# Patient Record
Sex: FEMALE | Race: WHITE | NOT HISPANIC OR LATINO | Employment: OTHER | URBAN - METROPOLITAN AREA
[De-identification: names, ages, dates, MRNs, and addresses within clinical notes are randomized per-mention and may not be internally consistent; named-entity substitution may affect disease eponyms.]

---

## 2020-01-09 ENCOUNTER — HOSPITAL ENCOUNTER (OUTPATIENT)
Dept: OTHER | Facility: HOSPITAL | Age: 80
Discharge: HOME | End: 2020-01-09
Attending: PHYSICAL MEDICINE & REHABILITATION

## 2020-01-09 LAB
ALBUMIN SERPL-MCNC: 3.8 G/DL (ref 3.5–5)
ALBUMIN/GLOB SERPL: 1.1 {RATIO} (ref 1.4–2.6)
ALP SERPL-CCNC: 100 U/L (ref 43–160)
ALT SERPL-CCNC: 8 U/L (ref 10–40)
ANION GAP SERPL CALC-SCNC: 15 MMOL/L (ref 8–19)
AST SERPL-CCNC: 15 U/L (ref 15–50)
BASOPHILS # BLD AUTO: 0.02 10*3/UL (ref 0–0.2)
BASOPHILS NFR BLD AUTO: 0.2 % (ref 0–3)
BILIRUB SERPL-MCNC: 0.48 MG/DL (ref 0.2–1.3)
BUN SERPL-MCNC: 8 MG/DL (ref 5–25)
BUN/CREAT SERPL: 8 {RATIO} (ref 6–20)
CALCIUM SERPL-MCNC: 9.4 MG/DL (ref 8.7–10.4)
CHLORIDE SERPL-SCNC: 100 MMOL/L (ref 99–111)
CHOLEST SERPL-MCNC: 209 MG/DL (ref 107–200)
CHOLEST/HDLC SERPL: 2.8 {RATIO} (ref 3–6)
CONV ABS IMM GRAN: 0.02 10*3/UL (ref 0–0.2)
CONV CO2: 30 MMOL/L (ref 22–32)
CONV IMMATURE GRAN: 0.2 % (ref 0–1.8)
CONV TOTAL PROTEIN: 7.2 G/DL (ref 6.3–8.2)
CREAT UR-MCNC: 0.97 MG/DL (ref 0.5–0.9)
DEPRECATED RDW RBC AUTO: 47.4 FL (ref 36.4–46.3)
EOSINOPHIL # BLD AUTO: 0.12 10*3/UL (ref 0–0.7)
EOSINOPHIL # BLD AUTO: 1.4 % (ref 0–7)
ERYTHROCYTE [DISTWIDTH] IN BLOOD BY AUTOMATED COUNT: 13.4 % (ref 11.7–14.4)
GFR SERPLBLD BASED ON 1.73 SQ M-ARVRAT: 56 ML/MIN/{1.73_M2}
GLOBULIN UR ELPH-MCNC: 3.4 G/DL (ref 2–3.5)
GLUCOSE SERPL-MCNC: 102 MG/DL (ref 65–99)
HCT VFR BLD AUTO: 43.6 % (ref 37–47)
HDLC SERPL-MCNC: 76 MG/DL (ref 40–60)
HGB BLD-MCNC: 14 G/DL (ref 12–16)
LDLC SERPL CALC-MCNC: 111 MG/DL (ref 70–100)
LYMPHOCYTES # BLD AUTO: 1.74 10*3/UL (ref 1–5)
LYMPHOCYTES NFR BLD AUTO: 20.1 % (ref 20–45)
MCH RBC QN AUTO: 30.8 PG (ref 27–31)
MCHC RBC AUTO-ENTMCNC: 32.1 G/DL (ref 33–37)
MCV RBC AUTO: 95.8 FL (ref 81–99)
MONOCYTES # BLD AUTO: 0.86 10*3/UL (ref 0.2–1.2)
MONOCYTES NFR BLD AUTO: 9.9 % (ref 3–10)
NEUTROPHILS # BLD AUTO: 5.89 10*3/UL (ref 2–8)
NEUTROPHILS NFR BLD AUTO: 68.2 % (ref 30–85)
NRBC CBCN: 0 % (ref 0–0.7)
OSMOLALITY SERPL CALC.SUM OF ELEC: 289 MOSM/KG (ref 273–304)
PLATELET # BLD AUTO: 283 10*3/UL (ref 130–400)
PMV BLD AUTO: 11 FL (ref 9.4–12.3)
POTASSIUM SERPL-SCNC: 4.6 MMOL/L (ref 3.5–5.3)
RBC # BLD AUTO: 4.55 10*6/UL (ref 4.2–5.4)
SODIUM SERPL-SCNC: 140 MMOL/L (ref 135–147)
T4 FREE SERPL-MCNC: 1.1 NG/DL (ref 0.9–1.8)
TRIGL SERPL-MCNC: 109 MG/DL (ref 40–150)
TSH SERPL-ACNC: 1.25 M[IU]/L (ref 0.27–4.2)
VLDLC SERPL-MCNC: 22 MG/DL (ref 5–37)
WBC # BLD AUTO: 8.65 10*3/UL (ref 4.8–10.8)

## 2020-01-24 ENCOUNTER — HOSPITAL ENCOUNTER (OUTPATIENT)
Dept: OTHER | Facility: HOSPITAL | Age: 80
Discharge: HOME | End: 2020-01-24
Attending: PHYSICAL MEDICINE & REHABILITATION

## 2020-01-24 LAB
APPEARANCE UR: CLEAR
BILIRUB UR QL: NEGATIVE
COLOR UR: ABNORMAL
CONV BACTERIA: NEGATIVE
CONV COLLECTION SOURCE (UA): ABNORMAL
CONV HYALINE CASTS IN URINE MICRO: ABNORMAL /[LPF]
CONV UROBILINOGEN IN URINE BY AUTOMATED TEST STRIP: 1 {EHRLICHU}/DL (ref 0.1–1)
GLUCOSE UR QL: NEGATIVE MG/DL
HGB UR QL STRIP: NEGATIVE
KETONES UR QL STRIP: ABNORMAL MG/DL
LEUKOCYTE ESTERASE UR QL STRIP: ABNORMAL
NITRITE UR QL STRIP: NEGATIVE
PH UR STRIP.AUTO: 6.5 [PH] (ref 5–8)
PROT UR QL: ABNORMAL MG/DL
RBC #/AREA URNS HPF: ABNORMAL /[HPF]
SP GR UR: 1.03 (ref 1–1.03)
WBC #/AREA URNS HPF: ABNORMAL /[HPF]

## 2020-01-26 LAB — BACTERIA UR CULT: NORMAL

## 2021-07-02 ENCOUNTER — APPOINTMENT (OUTPATIENT)
Dept: GENERAL RADIOLOGY | Facility: HOSPITAL | Age: 81
End: 2021-07-02

## 2021-07-02 VITALS
RESPIRATION RATE: 16 BRPM | WEIGHT: 143.08 LBS | BODY MASS INDEX: 22.99 KG/M2 | SYSTOLIC BLOOD PRESSURE: 130 MMHG | OXYGEN SATURATION: 95 % | HEIGHT: 66 IN | TEMPERATURE: 98 F | DIASTOLIC BLOOD PRESSURE: 75 MMHG | HEART RATE: 66 BPM

## 2021-07-02 LAB
ALBUMIN SERPL-MCNC: 4.2 G/DL (ref 3.5–5.2)
ALBUMIN/GLOB SERPL: 1.2 G/DL
ALP SERPL-CCNC: 89 U/L (ref 39–117)
ALT SERPL W P-5'-P-CCNC: 9 U/L (ref 1–33)
ANION GAP SERPL CALCULATED.3IONS-SCNC: 10.6 MMOL/L (ref 5–15)
AST SERPL-CCNC: 15 U/L (ref 1–32)
BASOPHILS # BLD AUTO: 0.04 10*3/MM3 (ref 0–0.2)
BASOPHILS NFR BLD AUTO: 0.4 % (ref 0–1.5)
BILIRUB SERPL-MCNC: 0.5 MG/DL (ref 0–1.2)
BUN SERPL-MCNC: 14 MG/DL (ref 8–23)
BUN/CREAT SERPL: 13.7 (ref 7–25)
CALCIUM SPEC-SCNC: 9.3 MG/DL (ref 8.6–10.5)
CHLORIDE SERPL-SCNC: 101 MMOL/L (ref 98–107)
CO2 SERPL-SCNC: 24.4 MMOL/L (ref 22–29)
CREAT SERPL-MCNC: 1.02 MG/DL (ref 0.57–1)
DEPRECATED RDW RBC AUTO: 48.8 FL (ref 37–54)
EOSINOPHIL # BLD AUTO: 0.14 10*3/MM3 (ref 0–0.4)
EOSINOPHIL NFR BLD AUTO: 1.3 % (ref 0.3–6.2)
ERYTHROCYTE [DISTWIDTH] IN BLOOD BY AUTOMATED COUNT: 14.2 % (ref 12.3–15.4)
GFR SERPL CREATININE-BSD FRML MDRD: 52 ML/MIN/1.73
GLOBULIN UR ELPH-MCNC: 3.4 GM/DL
GLUCOSE SERPL-MCNC: 114 MG/DL (ref 65–99)
HCT VFR BLD AUTO: 45.9 % (ref 34–46.6)
HGB BLD-MCNC: 14.7 G/DL (ref 12–15.9)
IMM GRANULOCYTES # BLD AUTO: 0.04 10*3/MM3 (ref 0–0.05)
IMM GRANULOCYTES NFR BLD AUTO: 0.4 % (ref 0–0.5)
LYMPHOCYTES # BLD AUTO: 2.01 10*3/MM3 (ref 0.7–3.1)
LYMPHOCYTES NFR BLD AUTO: 18.8 % (ref 19.6–45.3)
MAGNESIUM SERPL-MCNC: 2.1 MG/DL (ref 1.6–2.4)
MCH RBC QN AUTO: 30.2 PG (ref 26.6–33)
MCHC RBC AUTO-ENTMCNC: 32 G/DL (ref 31.5–35.7)
MCV RBC AUTO: 94.3 FL (ref 79–97)
MONOCYTES # BLD AUTO: 0.87 10*3/MM3 (ref 0.1–0.9)
MONOCYTES NFR BLD AUTO: 8.2 % (ref 5–12)
NEUTROPHILS NFR BLD AUTO: 7.57 10*3/MM3 (ref 1.7–7)
NEUTROPHILS NFR BLD AUTO: 70.9 % (ref 42.7–76)
NRBC BLD AUTO-RTO: 0 /100 WBC (ref 0–0.2)
PLATELET # BLD AUTO: 249 10*3/MM3 (ref 140–450)
PMV BLD AUTO: 10.5 FL (ref 6–12)
POTASSIUM SERPL-SCNC: 3.8 MMOL/L (ref 3.5–5.2)
PROT SERPL-MCNC: 7.6 G/DL (ref 6–8.5)
RBC # BLD AUTO: 4.87 10*6/MM3 (ref 3.77–5.28)
SODIUM SERPL-SCNC: 136 MMOL/L (ref 136–145)
TROPONIN T SERPL-MCNC: <0.01 NG/ML (ref 0–0.03)
WBC # BLD AUTO: 10.67 10*3/MM3 (ref 3.4–10.8)

## 2021-07-02 PROCEDURE — 93010 ELECTROCARDIOGRAM REPORT: CPT | Performed by: SPECIALIST

## 2021-07-02 PROCEDURE — 85025 COMPLETE CBC W/AUTO DIFF WBC: CPT

## 2021-07-02 PROCEDURE — 83735 ASSAY OF MAGNESIUM: CPT

## 2021-07-02 PROCEDURE — 80053 COMPREHEN METABOLIC PANEL: CPT

## 2021-07-02 PROCEDURE — 99211 OFF/OP EST MAY X REQ PHY/QHP: CPT

## 2021-07-02 PROCEDURE — 93005 ELECTROCARDIOGRAM TRACING: CPT

## 2021-07-02 PROCEDURE — 84484 ASSAY OF TROPONIN QUANT: CPT

## 2021-07-02 RX ORDER — SODIUM CHLORIDE 0.9 % (FLUSH) 0.9 %
10 SYRINGE (ML) INJECTION AS NEEDED
Status: DISCONTINUED | OUTPATIENT
Start: 2021-07-02 | End: 2021-07-03 | Stop reason: HOSPADM

## 2021-07-03 ENCOUNTER — HOSPITAL ENCOUNTER (EMERGENCY)
Facility: HOSPITAL | Age: 81
Discharge: LEFT BEFORE DISCHARGE | End: 2021-07-03

## 2021-07-03 LAB
HOLD SPECIMEN: NORMAL
HOLD SPECIMEN: NORMAL
WHOLE BLOOD HOLD SPECIMEN: NORMAL

## 2021-07-04 LAB — QT INTERVAL: 410 MS

## 2021-07-11 ENCOUNTER — APPOINTMENT (OUTPATIENT)
Dept: GENERAL RADIOLOGY | Facility: HOSPITAL | Age: 81
End: 2021-07-11

## 2021-07-11 ENCOUNTER — HOSPITAL ENCOUNTER (EMERGENCY)
Facility: HOSPITAL | Age: 81
Discharge: HOME | End: 2021-07-11
Attending: EMERGENCY MEDICINE | Admitting: EMERGENCY MEDICINE

## 2021-07-11 VITALS
WEIGHT: 147.49 LBS | RESPIRATION RATE: 20 BRPM | BODY MASS INDEX: 27.85 KG/M2 | HEART RATE: 70 BPM | DIASTOLIC BLOOD PRESSURE: 77 MMHG | OXYGEN SATURATION: 94 % | SYSTOLIC BLOOD PRESSURE: 120 MMHG | HEIGHT: 61 IN | TEMPERATURE: 98.9 F

## 2021-07-11 DIAGNOSIS — S82.892A CLOSED AVULSION FRACTURE OF LEFT ANKLE, INITIAL ENCOUNTER: Primary | ICD-10-CM

## 2021-07-11 PROCEDURE — 73630 X-RAY EXAM OF FOOT: CPT | Performed by: RADIOLOGY

## 2021-07-11 PROCEDURE — 73610 X-RAY EXAM OF ANKLE: CPT

## 2021-07-11 PROCEDURE — 73630 X-RAY EXAM OF FOOT: CPT

## 2021-07-11 PROCEDURE — 99284 EMERGENCY DEPT VISIT MOD MDM: CPT

## 2021-07-11 PROCEDURE — 73610 X-RAY EXAM OF ANKLE: CPT | Performed by: RADIOLOGY

## 2021-07-11 RX ORDER — VILAZODONE HYDROCHLORIDE 20 MG/1
20 TABLET ORAL DAILY
COMMUNITY

## 2021-07-11 RX ORDER — HYDROCODONE BITARTRATE AND ACETAMINOPHEN 5; 325 MG/1; MG/1
1 TABLET ORAL EVERY 6 HOURS PRN
Status: DISCONTINUED | OUTPATIENT
Start: 2021-07-11 | End: 2021-07-11 | Stop reason: HOSPADM

## 2021-07-11 RX ORDER — DONEPEZIL HYDROCHLORIDE 10 MG/1
10 TABLET, FILM COATED ORAL NIGHTLY
COMMUNITY

## 2021-07-11 RX ORDER — QUETIAPINE FUMARATE 50 MG/1
50 TABLET, FILM COATED ORAL NIGHTLY
COMMUNITY

## 2021-07-11 RX ORDER — ACETAMINOPHEN 325 MG/1
650 TABLET ORAL ONCE
Status: COMPLETED | OUTPATIENT
Start: 2021-07-11 | End: 2021-07-11

## 2021-07-11 RX ORDER — HYDROCODONE BITARTRATE AND ACETAMINOPHEN 5; 325 MG/1; MG/1
1 TABLET ORAL EVERY 8 HOURS PRN
Qty: 8 TABLET | Refills: 0 | Status: SHIPPED | OUTPATIENT
Start: 2021-07-11 | End: 2021-07-14

## 2021-07-11 RX ORDER — DOCUSATE SODIUM 100 MG/1
100 CAPSULE, LIQUID FILLED ORAL 2 TIMES DAILY
Qty: 30 CAPSULE | Refills: 0 | Status: SHIPPED | OUTPATIENT
Start: 2021-07-11 | End: 2021-07-26

## 2021-07-11 RX ORDER — MONTELUKAST SODIUM 10 MG/1
10 TABLET ORAL NIGHTLY
COMMUNITY

## 2021-07-11 RX ADMIN — ACETAMINOPHEN 650 MG: 325 TABLET ORAL at 16:35

## 2021-07-11 RX ADMIN — HYDROCODONE BITARTRATE AND ACETAMINOPHEN 1 TABLET: 5; 325 TABLET ORAL at 18:01

## 2021-07-11 NOTE — ED PROVIDER NOTES
Time: 02:39 EDT  Arrived by: Ambulance  Chief Complaint: Left ankle/foot pain  History provided by: Patient  History is limited by: Alzheimer's     History of Present Illness:  Patient is a 81 y.o. year old female that presents to the emergency department after a fall.  She is walking from room to room and twisted her left ankle and complains of left ankle pain.  She denies any other injury or hitting her head.      Fall  Mechanism of injury: fall    Injury location:  Leg  Leg injury location:  L ankle  Incident location:  Home  Time since incident:  45 minutes  Arrived directly from scene: yes    Fall:     Fall occurred:  Tripped    Impact surface:  Hard floor    Point of impact:  Unable to specify    Entrapped after fall: no    Associated symptoms: no abdominal pain, no chest pain, no headaches, no nausea, no seizures and no vomiting            Similar Symptoms Previously: No  Recently seen: Yes, here for other complaints    Patient Care Team  Primary Care Provider: Dr. Cordoba    Past Medical History:     No Known Allergies  Past Medical History:   Diagnosis Date   • Alzheimer disease (CMS/[facility])      History reviewed. No pertinent surgical history.  History reviewed. No pertinent family history.    Home Medications:  Prior to Admission medications    Not on File        Social History:   PT  reports that she has never smoked. She has never used smokeless tobacco. She reports that she does not drink alcohol and does not use drugs.    Record Review:  I have reviewed the patient's records in [place].     Review of Systems  Review of Systems   Constitutional: Negative for chills and fever.   HENT: Negative for congestion, ear pain and sore throat.    Eyes: Negative for pain.   Respiratory: Negative for cough, chest tightness and shortness of breath.    Cardiovascular: Negative for chest pain.   Gastrointestinal: Negative for abdominal pain, diarrhea, nausea and vomiting.   Genitourinary: Negative for flank pain and  "hematuria.   Musculoskeletal: Negative for joint swelling.        Left ankle pain   Skin: Negative for pallor and wound.   Neurological: Negative for seizures and headaches.   All other systems reviewed and are negative.       Physical Exam  /77 (BP Location: Right arm, Patient Position: Lying)   Pulse 70   Temp 98.9 °F (37.2 °C) (Oral)   Resp 20   Ht 154.9 cm (61\")   Wt 66.9 kg (147 lb 7.8 oz)   SpO2 94%   Breastfeeding No   BMI 27.87 kg/m²     Physical Exam  Vitals and nursing note reviewed.   Constitutional:       General: She is not in acute distress.     Appearance: Normal appearance. She is not toxic-appearing.   HENT:      Head: Normocephalic and atraumatic.      Mouth/Throat:      Mouth: Mucous membranes are moist.   Eyes:      Extraocular Movements: Extraocular movements intact.      Pupils: Pupils are equal, round, and reactive to light.   Cardiovascular:      Rate and Rhythm: Normal rate and regular rhythm.      Pulses: Normal pulses.      Heart sounds: Normal heart sounds.   Pulmonary:      Effort: Pulmonary effort is normal. No respiratory distress.      Breath sounds: Normal breath sounds.   Abdominal:      General: Abdomen is flat.      Palpations: Abdomen is soft.      Tenderness: There is no abdominal tenderness.   Musculoskeletal:         General: Normal range of motion.      Cervical back: Normal range of motion and neck supple.      Right ankle: Normal.      Left ankle: Swelling and ecchymosis present. Tenderness present over the lateral malleolus. Normal pulse.      Left foot: Swelling and tenderness present.        Legs:    Skin:     General: Skin is warm and dry.   Neurological:      Mental Status: She is alert and oriented to person, place, and time. Mental status is at baseline.                  ED Course  /77 (BP Location: Right arm, Patient Position: Lying)   Pulse 70   Temp 98.9 °F (37.2 °C) (Oral)   Resp 20   Ht 154.9 cm (61\")   Wt 66.9 kg (147 lb 7.8 oz)   SpO2 " 94%   Breastfeeding No   BMI 27.87 kg/m²     Medications   acetaminophen (TYLENOL) tablet 650 mg (650 mg Oral Given 7/11/21 1635)     XR Ankle 3+ View Left    Result Date: 7/11/2021  Narrative: PROCEDURE: XR FOOT 3+ VW LEFT, 7/11/2021, 16:44 XR ANKLE 3+ VW LEFT, 7/11/2021, 16:44  COMPARISON: None  INDICATIONS: LEFT LATERAL/DORSAL FOOT PAIN POST FALL  FINDINGS:  Bony structures have an osteopenic appearance.  5 mm bony density adjacent to the anterior and cranial aspect of the talus may represent degenerative spur or possibly avulsion type injury.  Mild degenerative change consistent with osteoarthritis is seen in the tibiotalar joint.  Mild degenerative changes seen in the tarsal region.  Moderate degenerative changes seen in the 1st MTP joint, with mild hallux valgus and bunion deformity.  CONCLUSION:  Left ankle and left foot series demonstrating 5 mm bony density adjacent to the anterior and cranial aspect of the talus which may represent degenerative spur or avulsion type injury.  Degenerative change consistent with osteoarthritis, as above.      BRANDAN MATA MD       Electronically Signed and Approved By: BRANDAN MATA MD on 7/11/2021 at 17:15             XR Foot 3+ View Left    Result Date: 7/11/2021  Narrative: PROCEDURE: XR FOOT 3+ VW LEFT, 7/11/2021, 16:44 XR ANKLE 3+ VW LEFT, 7/11/2021, 16:44  COMPARISON: None  INDICATIONS: LEFT LATERAL/DORSAL FOOT PAIN POST FALL  FINDINGS:  Bony structures have an osteopenic appearance.  5 mm bony density adjacent to the anterior and cranial aspect of the talus may represent degenerative spur or possibly avulsion type injury.  Mild degenerative change consistent with osteoarthritis is seen in the tibiotalar joint.  Mild degenerative changes seen in the tarsal region.  Moderate degenerative changes seen in the 1st MTP joint, with mild hallux valgus and bunion deformity.  CONCLUSION:  Left ankle and left foot series demonstrating 5 mm bony density adjacent to the  anterior and cranial aspect of the talus which may represent degenerative spur or avulsion type injury.  Degenerative change consistent with osteoarthritis, as above.      BRANDAN MATA MD       Electronically Signed and Approved By: BRANDAN MATA MD on 7/11/2021 at 17:15               Procedures/EKGs:  Procedures        Medical Decision Making:                     MDM  Number of Diagnoses or Management Options  Closed avulsion fracture of left ankle, initial encounter: new and requires workup  Diagnosis management comments: The patient was placed in a splint.  Her pain was improved after medication.  She is instructed to follow-up with Dr. Romo, call his office tomorrow.       Amount and/or Complexity of Data Reviewed  Tests in the radiology section of CPT®: ordered and reviewed         Final diagnoses:   Closed avulsion fracture of left ankle, initial encounter        Disposition:  ED Disposition     ED Disposition Condition Comment    Discharge Stable            Radha De Los Santos, APRN  07/12/21 0239

## 2021-07-11 NOTE — DISCHARGE INSTRUCTIONS
Please call Dr. Bowden's office in the morning to schedule appointment for follow-up.  Wear your walking boot.  Elevate your leg and apply ice several times a day to relieve swelling.      Your pain medication may make you constipated, take the prescribed stool softener while taking the pain medication.

## 2021-07-14 ENCOUNTER — OFFICE VISIT (OUTPATIENT)
Dept: PODIATRY | Facility: CLINIC | Age: 81
End: 2021-07-14

## 2021-07-14 VITALS
DIASTOLIC BLOOD PRESSURE: 78 MMHG | WEIGHT: 147 LBS | HEIGHT: 61 IN | TEMPERATURE: 97.1 F | SYSTOLIC BLOOD PRESSURE: 145 MMHG | OXYGEN SATURATION: 98 % | HEART RATE: 84 BPM | BODY MASS INDEX: 27.75 KG/M2

## 2021-07-14 DIAGNOSIS — L60.0 ONYCHOCRYPTOSIS: ICD-10-CM

## 2021-07-14 DIAGNOSIS — G30.9 ALZHEIMER'S DEMENTIA WITHOUT BEHAVIORAL DISTURBANCE, UNSPECIFIED TIMING OF DEMENTIA ONSET: ICD-10-CM

## 2021-07-14 DIAGNOSIS — M79.671 FOOT PAIN, BILATERAL: Primary | ICD-10-CM

## 2021-07-14 DIAGNOSIS — M79.672 FOOT PAIN, BILATERAL: Primary | ICD-10-CM

## 2021-07-14 DIAGNOSIS — F02.80 ALZHEIMER'S DEMENTIA WITHOUT BEHAVIORAL DISTURBANCE, UNSPECIFIED TIMING OF DEMENTIA ONSET: ICD-10-CM

## 2021-07-14 DIAGNOSIS — S82.892A CLOSED FRACTURE OF LEFT ANKLE, INITIAL ENCOUNTER: ICD-10-CM

## 2021-07-14 DIAGNOSIS — S93.492A SPRAIN OF ANTERIOR TALOFIBULAR LIGAMENT OF LEFT ANKLE, INITIAL ENCOUNTER: ICD-10-CM

## 2021-07-14 DIAGNOSIS — B35.1 ONYCHOMYCOSIS: ICD-10-CM

## 2021-07-14 PROCEDURE — 11721 DEBRIDE NAIL 6 OR MORE: CPT | Performed by: PODIATRIST

## 2021-07-14 PROCEDURE — 99203 OFFICE O/P NEW LOW 30 MIN: CPT | Performed by: PODIATRIST

## 2021-07-14 NOTE — PATIENT INSTRUCTIONS
Patient is transition to regular shoe gear as tolerated.    Patient and her son are to monitor for recurrence and any new symptoms and to contact Dr. Bowden's office for a follow-up appointment.      The patient and her son state understanding and agreement with this plan.

## 2021-07-14 NOTE — PROGRESS NOTES
[facility] - PODIATRY    Today's Date: 07/14/21    Patient Name: Sonja Gunn  MRN: [id number]  CSN: [id number]  PCP: Mike Cordoba MD  Referring Provider: No ref. provider found    SUBJECTIVE     Chief Complaint   Patient presents with   • Left Foot - Fracture     HPI: Sonja Gunn, a 81 y.o.female, comes to clinic.    New problem: Injured left ankle  11 July 2021  Acute at her apartment at [facility]  Sore at first, improving, has been walking without the cam walker in her apartment  Minimal pain with shoe gear and ambulation  ER visit, x-rays, Cam walker applied    New, Established, New Problem:  New, second problem  Location:  Toenails  Duration:   Greater than five years  Onset:  Gradual  Nature:  sore with palpation.  Stable, worsening, improving:   Worsening  Aggravating factors:  Pain with shoe gear and ambulation.  Previous Treatment:  debridement at [facility]    No other pedal complaints at this time.    Patient denies any fevers, chills, nausea, vomiting, shortness of breathe, nor any other constitutional signs nor symptoms.       Last PCP Visit:  Mike Cordoba MD, April 2021    She presents with her adult son  Past Medical History:   Diagnosis Date   • Alzheimer disease (CMS/HCC)      History reviewed. No pertinent surgical history.  Family History   Family history unknown: Yes     Social History     Socioeconomic History   • Marital status:      Spouse name: Not on file   • Number of children: Not on file   • Years of education: Not on file   • Highest education level: Not on file   Tobacco Use   • Smoking status: Never Smoker   • Smokeless tobacco: Never Used   Vaping Use   • Vaping Use: Never used   Substance and Sexual Activity   • Alcohol use: Never   • Drug use: Never   • Sexual activity: Defer     No Known Allergies  Current Outpatient Medications   Medication Sig Dispense Refill   • docusate sodium (COLACE) 100 MG capsule Take 1 capsule by  mouth 2 (Two) Times a Day for 15 days. 30 capsule 0   • donepezil (ARICEPT) 10 MG tablet Take 10 mg by mouth Every Night.     • HYDROcodone-acetaminophen (NORCO) 5-325 MG per tablet Take 1 tablet by mouth Every 8 (Eight) Hours As Needed for Moderate Pain  for up to 3 days. 8 tablet 0   • montelukast (SINGULAIR) 10 MG tablet Take 10 mg by mouth Every Night.     • QUEtiapine (SEROquel) 50 MG tablet Take 50 mg by mouth Every Night.     • vilazodone (VIIBRYD) 20 MG tablet tablet Take 20 mg by mouth Daily.       No current facility-administered medications for this visit.     Review of Systems   Constitutional: Negative.    Musculoskeletal:        Sore anterior lateral left ankle   Skin:        Painful toenails bilaterally   All other systems reviewed and are negative.      OBJECTIVE     Vitals:    21 1250   BP: 145/78   Pulse: 84   Temp: 97.1 °F (36.2 °C)   SpO2: 98%       Patient seen in no apparent distress.      PHYSICAL EXAM:     Foot/Ankle Exam:       General:   Appearance: elderly    Appearance comment:  Alzheimer's dementia  Orientation: disoriented    Gait: antalgic    Shoes: MRI left foot and lower leg, regular shoe on right foot.    VASCULAR      Right Foot Vascularity   Dorsalis pedis:  1+  Posterior tibial:  1+  Skin Temperature: cool    Edema Gradin+  CFT:  3  Varicosities: moderate varicosities       Left Foot Vascularity   Dorsalis pedis:  1+  Posterior tibial:  1+  Skin Temperature: cool    Edema Gradin+  CFT:  3  Varicosities: moderate varicosities        NEUROLOGIC     Right Foot Neurologic   Normal sensation    Light touch sensation:  Normal  Vibratory sensation:  Normal  Hot/Cold sensation: normal       Left Foot Neurologic   Normal sensation    Light touch sensation:  Normal  Vibratory sensation:  Normal  Hot/cold sensation: normal       MUSCULOSKELETAL      Left Foot Musculoskeletal   Ecchymosis:  Ankle joint (Ecchymosis anterior lateral left ankle.)  Tenderness comment:  Slight  tenderness palpation anterior lateral left ankle.  No guarding with range of motion.     MUSCLE STRENGTH     Right Foot Muscle Strength   Foot dorsiflexion:  4-  Foot plantar flexion:  4-  Foot inversion:  4-  Foot eversion:  4-     Left Foot Muscle Strength   Foot dorsiflexion:  4-  Foot plantar flexion:  4-  Foot inversion:  4-  Foot eversion:  4-     DERMATOLOGIC     Right Foot Dermatologic   Skin: skin intact    Nails: onychomycosis, abnormally thick, subungual debris, dystrophic nails and ingrown toenail    Nails comment:  Toenails 1, 2, 3, 4, and 5     Left Foot Dermatologic   Skin: skin intact    Nails: onychomycosis, abnormally thick, subungual debris, dystrophic nails and ingrown toenail    Nails comment:  Toenails 1, 2, 3, 4, and 5    Radiographs from [facility] x-rays show possible avulsion fracture anterior left ankle joint versus possible old avulsion fracture.    ASSESSMENT/PLAN     Diagnoses and all orders for this visit:    1. Foot pain, bilateral (Primary)    2. Onychomycosis    3. Alzheimer's dementia without behavioral disturbance, unspecified timing of dementia onset (CMS/[facility])    4. Onychocryptosis    5. Sprain of anterior talofibular ligament of left ankle, initial encounter    6. Closed fracture of left ankle, initial encounter        Comprehensive lower extremity examination and evaluation was performed.    Discussed findings and treatment plan including risks, benefits, and treatment options with patient in detail. Patient agreed with treatment plan.    Toenails 1 through 5 bilaterally were debrided thickness and length and then smoothed with a Dremel Tool.  Tolerated the procedure well without complications.    An After Visit Summary was printed and given to the patient at discharge, including (if requested) any available informative/educational handouts regarding diagnosis, treatment, or medications. All questions were answered to patient/family satisfaction. Should symptoms fail  to improve or worsen they agree to call or return to clinic or to go to the Emergency Department. Discussed the importance of following up with any needed screening tests/labs/specialist appointments and any requested follow-up recommended by me today. Importance of maintaining follow-up discussed and patient accepts that missed appointments can delay diagnosis and potentially lead to worsening of conditions.    Return Patient son request as needed follow-up, if symptoms worsen or fail to improve., or sooner if acute issues arise.    This document has been electronically signed by Carl Bowden DPM on July 14, 2021 15:39 EDT

## 2022-03-29 ENCOUNTER — HOSPITAL ENCOUNTER (EMERGENCY)
Facility: HOSPITAL | Age: 82
Discharge: HOME | End: 2022-03-29
Attending: EMERGENCY MEDICINE | Admitting: EMERGENCY MEDICINE

## 2022-03-29 VITALS
OXYGEN SATURATION: 96 % | RESPIRATION RATE: 18 BRPM | DIASTOLIC BLOOD PRESSURE: 70 MMHG | TEMPERATURE: 98.4 F | HEART RATE: 74 BPM | HEIGHT: 64 IN | BODY MASS INDEX: 22.92 KG/M2 | SYSTOLIC BLOOD PRESSURE: 104 MMHG | WEIGHT: 134.26 LBS

## 2022-03-29 DIAGNOSIS — K59.00 CONSTIPATION, UNSPECIFIED CONSTIPATION TYPE: Primary | ICD-10-CM

## 2022-03-29 PROCEDURE — 99284 EMERGENCY DEPT VISIT MOD MDM: CPT

## 2022-12-09 ENCOUNTER — LAB REQUISITION (OUTPATIENT)
Dept: LAB | Facility: HOSPITAL | Age: 82
End: 2022-12-09

## 2022-12-09 DIAGNOSIS — Z79.899 OTHER LONG TERM (CURRENT) DRUG THERAPY: ICD-10-CM

## 2022-12-09 LAB
25(OH)D3 SERPL-MCNC: 64 NG/ML (ref 30–100)
ALBUMIN SERPL-MCNC: 3.4 G/DL (ref 3.5–5.2)
ALBUMIN/GLOB SERPL: 1 G/DL
ALP SERPL-CCNC: 93 U/L (ref 39–117)
ALT SERPL W P-5'-P-CCNC: <5 U/L (ref 1–33)
ANION GAP SERPL CALCULATED.3IONS-SCNC: 9.6 MMOL/L (ref 5–15)
AST SERPL-CCNC: 11 U/L (ref 1–32)
BASOPHILS # BLD AUTO: 0.02 10*3/MM3 (ref 0–0.2)
BASOPHILS NFR BLD AUTO: 0.2 % (ref 0–1.5)
BILIRUB SERPL-MCNC: 0.5 MG/DL (ref 0–1.2)
BUN SERPL-MCNC: 8 MG/DL (ref 8–23)
BUN/CREAT SERPL: 11.6 (ref 7–25)
CALCIUM SPEC-SCNC: 9.1 MG/DL (ref 8.6–10.5)
CHLORIDE SERPL-SCNC: 101 MMOL/L (ref 98–107)
CHOLEST SERPL-MCNC: 214 MG/DL (ref 0–200)
CO2 SERPL-SCNC: 28.4 MMOL/L (ref 22–29)
CREAT SERPL-MCNC: 0.69 MG/DL (ref 0.57–1)
DEPRECATED RDW RBC AUTO: 45 FL (ref 37–54)
EGFRCR SERPLBLD CKD-EPI 2021: 87.9 ML/MIN/1.73
EOSINOPHIL # BLD AUTO: 0.22 10*3/MM3 (ref 0–0.4)
EOSINOPHIL NFR BLD AUTO: 2.1 % (ref 0.3–6.2)
ERYTHROCYTE [DISTWIDTH] IN BLOOD BY AUTOMATED COUNT: 13.1 % (ref 12.3–15.4)
FOLATE SERPL-MCNC: 18.3 NG/ML (ref 4.78–24.2)
GLOBULIN UR ELPH-MCNC: 3.5 GM/DL
GLUCOSE SERPL-MCNC: 92 MG/DL (ref 65–99)
HCT VFR BLD AUTO: 39.8 % (ref 34–46.6)
HDLC SERPL-MCNC: 50 MG/DL (ref 40–60)
HGB BLD-MCNC: 13.2 G/DL (ref 12–15.9)
IMM GRANULOCYTES # BLD AUTO: 0.05 10*3/MM3 (ref 0–0.05)
IMM GRANULOCYTES NFR BLD AUTO: 0.5 % (ref 0–0.5)
LDLC SERPL CALC-MCNC: 146 MG/DL (ref 0–100)
LDLC/HDLC SERPL: 2.89 {RATIO}
LYMPHOCYTES # BLD AUTO: 2.19 10*3/MM3 (ref 0.7–3.1)
LYMPHOCYTES NFR BLD AUTO: 21.3 % (ref 19.6–45.3)
MCH RBC QN AUTO: 31.1 PG (ref 26.6–33)
MCHC RBC AUTO-ENTMCNC: 33.2 G/DL (ref 31.5–35.7)
MCV RBC AUTO: 93.6 FL (ref 79–97)
MONOCYTES # BLD AUTO: 1.24 10*3/MM3 (ref 0.1–0.9)
MONOCYTES NFR BLD AUTO: 12.1 % (ref 5–12)
NEUTROPHILS NFR BLD AUTO: 6.55 10*3/MM3 (ref 1.7–7)
NEUTROPHILS NFR BLD AUTO: 63.8 % (ref 42.7–76)
NRBC BLD AUTO-RTO: 0 /100 WBC (ref 0–0.2)
PLATELET # BLD AUTO: 338 10*3/MM3 (ref 140–450)
PMV BLD AUTO: 10.8 FL (ref 6–12)
POTASSIUM SERPL-SCNC: 3.3 MMOL/L (ref 3.5–5.2)
PROT SERPL-MCNC: 6.9 G/DL (ref 6–8.5)
RBC # BLD AUTO: 4.25 10*6/MM3 (ref 3.77–5.28)
SODIUM SERPL-SCNC: 139 MMOL/L (ref 136–145)
TRIGL SERPL-MCNC: 98 MG/DL (ref 0–150)
VIT B12 BLD-MCNC: 193 PG/ML (ref 211–946)
VLDLC SERPL-MCNC: 18 MG/DL (ref 5–40)
WBC NRBC COR # BLD: 10.27 10*3/MM3 (ref 3.4–10.8)

## 2022-12-09 PROCEDURE — 82306 VITAMIN D 25 HYDROXY: CPT

## 2022-12-09 PROCEDURE — 85025 COMPLETE CBC W/AUTO DIFF WBC: CPT

## 2022-12-09 PROCEDURE — 82746 ASSAY OF FOLIC ACID SERUM: CPT

## 2022-12-09 PROCEDURE — 80061 LIPID PANEL: CPT

## 2022-12-09 PROCEDURE — 82607 VITAMIN B-12: CPT

## 2022-12-09 PROCEDURE — 80053 COMPREHEN METABOLIC PANEL: CPT

## 2023-01-24 ENCOUNTER — LAB REQUISITION (OUTPATIENT)
Dept: LAB | Facility: HOSPITAL | Age: 83
End: 2023-01-24
Payer: MEDICARE

## 2023-01-24 DIAGNOSIS — R30.0 DYSURIA: ICD-10-CM

## 2023-01-24 LAB
BILIRUB UR QL STRIP: NEGATIVE
CLARITY UR: CLEAR
COLOR UR: ABNORMAL
GLUCOSE UR STRIP-MCNC: NEGATIVE MG/DL
HGB UR QL STRIP.AUTO: NEGATIVE
KETONES UR QL STRIP: ABNORMAL
LEUKOCYTE ESTERASE UR QL STRIP.AUTO: NEGATIVE
NITRITE UR QL STRIP: NEGATIVE
PH UR STRIP.AUTO: <=5 [PH] (ref 5–8)
PROT UR QL STRIP: ABNORMAL
SP GR UR STRIP: >=1.03 (ref 1–1.03)
UROBILINOGEN UR QL STRIP: ABNORMAL

## 2023-01-24 PROCEDURE — 81003 URINALYSIS AUTO W/O SCOPE: CPT

## 2023-02-27 ENCOUNTER — HOSPITAL ENCOUNTER (EMERGENCY)
Facility: HOSPITAL | Age: 83
Discharge: HOME | End: 2023-02-27
Attending: EMERGENCY MEDICINE | Admitting: EMERGENCY MEDICINE
Payer: MEDICARE

## 2023-02-27 VITALS
WEIGHT: 143.96 LBS | OXYGEN SATURATION: 98 % | HEIGHT: 64 IN | TEMPERATURE: 98.2 F | HEART RATE: 74 BPM | BODY MASS INDEX: 24.58 KG/M2 | RESPIRATION RATE: 20 BRPM | SYSTOLIC BLOOD PRESSURE: 106 MMHG | DIASTOLIC BLOOD PRESSURE: 70 MMHG

## 2023-02-27 DIAGNOSIS — R13.10 DYSPHAGIA, UNSPECIFIED TYPE: Primary | ICD-10-CM

## 2023-02-27 PROCEDURE — 99283 EMERGENCY DEPT VISIT LOW MDM: CPT

## 2024-01-11 ENCOUNTER — APPOINTMENT (OUTPATIENT)
Dept: CT IMAGING | Facility: HOSPITAL | Age: 84
End: 2024-01-11
Payer: MEDICARE

## 2024-01-11 ENCOUNTER — HOSPITAL ENCOUNTER (EMERGENCY)
Facility: HOSPITAL | Age: 84
Discharge: HOME | End: 2024-01-11
Attending: EMERGENCY MEDICINE
Payer: MEDICARE

## 2024-01-11 ENCOUNTER — APPOINTMENT (OUTPATIENT)
Dept: GENERAL RADIOLOGY | Facility: HOSPITAL | Age: 84
End: 2024-01-11
Payer: MEDICARE

## 2024-01-11 VITALS
DIASTOLIC BLOOD PRESSURE: 70 MMHG | WEIGHT: 143.96 LBS | SYSTOLIC BLOOD PRESSURE: 115 MMHG | HEIGHT: 64 IN | BODY MASS INDEX: 24.58 KG/M2 | RESPIRATION RATE: 15 BRPM | HEART RATE: 69 BPM | OXYGEN SATURATION: 95 % | TEMPERATURE: 98 F

## 2024-01-11 DIAGNOSIS — R41.82 ALTERED MENTAL STATUS, UNSPECIFIED ALTERED MENTAL STATUS TYPE: Primary | ICD-10-CM

## 2024-01-11 DIAGNOSIS — G30.9 SEVERE ALZHEIMER'S DEMENTIA, UNSPECIFIED TIMING OF DEMENTIA ONSET, UNSPECIFIED WHETHER BEHAVIORAL, PSYCHOTIC, OR MOOD DISTURBANCE OR ANXIETY: ICD-10-CM

## 2024-01-11 DIAGNOSIS — F02.C0 SEVERE ALZHEIMER'S DEMENTIA, UNSPECIFIED TIMING OF DEMENTIA ONSET, UNSPECIFIED WHETHER BEHAVIORAL, PSYCHOTIC, OR MOOD DISTURBANCE OR ANXIETY: ICD-10-CM

## 2024-01-11 LAB
ABO GROUP BLD: NORMAL
ALBUMIN SERPL-MCNC: 3.8 G/DL (ref 3.5–5.2)
ALBUMIN/GLOB SERPL: 1.5 G/DL
ALP SERPL-CCNC: 82 U/L (ref 39–117)
ALT SERPL W P-5'-P-CCNC: 8 U/L (ref 1–33)
ANION GAP SERPL CALCULATED.3IONS-SCNC: 9.4 MMOL/L (ref 5–15)
APTT PPP: 27.2 SECONDS (ref 24.2–34.2)
AST SERPL-CCNC: 13 U/L (ref 1–32)
BACTERIA UR QL AUTO: ABNORMAL /HPF
BASOPHILS # BLD AUTO: 0.04 10*3/MM3 (ref 0–0.2)
BASOPHILS NFR BLD AUTO: 0.5 % (ref 0–1.5)
BILIRUB SERPL-MCNC: 0.3 MG/DL (ref 0–1.2)
BILIRUB UR QL STRIP: NEGATIVE
BLD GP AB SCN SERPL QL: NEGATIVE
BUN SERPL-MCNC: 11 MG/DL (ref 8–23)
BUN/CREAT SERPL: 10.9 (ref 7–25)
CALCIUM SPEC-SCNC: 8.9 MG/DL (ref 8.6–10.5)
CHLORIDE SERPL-SCNC: 103 MMOL/L (ref 98–107)
CLARITY UR: CLEAR
CO2 SERPL-SCNC: 27.6 MMOL/L (ref 22–29)
COLOR UR: YELLOW
CREAT SERPL-MCNC: 1.01 MG/DL (ref 0.57–1)
DEPRECATED RDW RBC AUTO: 49.5 FL (ref 37–54)
EGFRCR SERPLBLD CKD-EPI 2021: 56 ML/MIN/1.73
EOSINOPHIL # BLD AUTO: 0.17 10*3/MM3 (ref 0–0.4)
EOSINOPHIL NFR BLD AUTO: 2.2 % (ref 0.3–6.2)
ERYTHROCYTE [DISTWIDTH] IN BLOOD BY AUTOMATED COUNT: 13.9 % (ref 12.3–15.4)
FLUAV SUBTYP SPEC NAA+PROBE: NOT DETECTED
FLUBV RNA ISLT QL NAA+PROBE: NOT DETECTED
GLOBULIN UR ELPH-MCNC: 2.5 GM/DL
GLUCOSE BLDC GLUCOMTR-MCNC: 118 MG/DL (ref 70–99)
GLUCOSE SERPL-MCNC: 91 MG/DL (ref 65–99)
GLUCOSE UR STRIP-MCNC: NEGATIVE MG/DL
HCT VFR BLD AUTO: 41.9 % (ref 34–46.6)
HGB BLD-MCNC: 13.1 G/DL (ref 12–15.9)
HGB UR QL STRIP.AUTO: NEGATIVE
HOLD SPECIMEN: NORMAL
HOLD SPECIMEN: NORMAL
HYALINE CASTS UR QL AUTO: ABNORMAL /LPF
IMM GRANULOCYTES # BLD AUTO: 0.01 10*3/MM3 (ref 0–0.05)
IMM GRANULOCYTES NFR BLD AUTO: 0.1 % (ref 0–0.5)
INR PPP: 0.97 (ref 0.86–1.15)
KETONES UR QL STRIP: NEGATIVE
LEUKOCYTE ESTERASE UR QL STRIP.AUTO: ABNORMAL
LYMPHOCYTES # BLD AUTO: 2.78 10*3/MM3 (ref 0.7–3.1)
LYMPHOCYTES NFR BLD AUTO: 36.1 % (ref 19.6–45.3)
MCH RBC QN AUTO: 30 PG (ref 26.6–33)
MCHC RBC AUTO-ENTMCNC: 31.3 G/DL (ref 31.5–35.7)
MCV RBC AUTO: 95.9 FL (ref 79–97)
MONOCYTES # BLD AUTO: 1.01 10*3/MM3 (ref 0.1–0.9)
MONOCYTES NFR BLD AUTO: 13.1 % (ref 5–12)
NEUTROPHILS NFR BLD AUTO: 3.69 10*3/MM3 (ref 1.7–7)
NEUTROPHILS NFR BLD AUTO: 48 % (ref 42.7–76)
NITRITE UR QL STRIP: NEGATIVE
NRBC BLD AUTO-RTO: 0 /100 WBC (ref 0–0.2)
PH UR STRIP.AUTO: 6 [PH] (ref 5–8)
PLATELET # BLD AUTO: 211 10*3/MM3 (ref 140–450)
PMV BLD AUTO: 10.9 FL (ref 6–12)
POTASSIUM SERPL-SCNC: 4.6 MMOL/L (ref 3.5–5.2)
PROT SERPL-MCNC: 6.3 G/DL (ref 6–8.5)
PROT UR QL STRIP: NEGATIVE
PROTHROMBIN TIME: 13 SECONDS (ref 11.8–14.9)
RBC # BLD AUTO: 4.37 10*6/MM3 (ref 3.77–5.28)
RBC # UR STRIP: ABNORMAL /HPF
REF LAB TEST METHOD: ABNORMAL
RH BLD: POSITIVE
RSV RNA NPH QL NAA+NON-PROBE: NOT DETECTED
SARS-COV-2 RNA RESP QL NAA+PROBE: NOT DETECTED
SODIUM SERPL-SCNC: 140 MMOL/L (ref 136–145)
SP GR UR STRIP: >1.03 (ref 1–1.03)
SQUAMOUS #/AREA URNS HPF: ABNORMAL /HPF
T&S EXPIRATION DATE: NORMAL
TROPONIN T SERPL HS-MCNC: 15 NG/L
UROBILINOGEN UR QL STRIP: ABNORMAL
WBC # UR STRIP: ABNORMAL /HPF
WBC NRBC COR # BLD AUTO: 7.7 10*3/MM3 (ref 3.4–10.8)
WHOLE BLOOD HOLD COAG: NORMAL
WHOLE BLOOD HOLD SPECIMEN: NORMAL

## 2024-01-11 PROCEDURE — 85025 COMPLETE CBC W/AUTO DIFF WBC: CPT | Performed by: EMERGENCY MEDICINE

## 2024-01-11 PROCEDURE — 99203 OFFICE O/P NEW LOW 30 MIN: CPT | Performed by: PSYCHIATRY & NEUROLOGY

## 2024-01-11 PROCEDURE — 86900 BLOOD TYPING SEROLOGIC ABO: CPT | Performed by: EMERGENCY MEDICINE

## 2024-01-11 PROCEDURE — 81001 URINALYSIS AUTO W/SCOPE: CPT | Performed by: EMERGENCY MEDICINE

## 2024-01-11 PROCEDURE — 25510000001 IOPAMIDOL PER 1 ML: Performed by: EMERGENCY MEDICINE

## 2024-01-11 PROCEDURE — 70496 CT ANGIOGRAPHY HEAD: CPT

## 2024-01-11 PROCEDURE — 36415 COLL VENOUS BLD VENIPUNCTURE: CPT

## 2024-01-11 PROCEDURE — 84484 ASSAY OF TROPONIN QUANT: CPT | Performed by: EMERGENCY MEDICINE

## 2024-01-11 PROCEDURE — 80053 COMPREHEN METABOLIC PANEL: CPT | Performed by: EMERGENCY MEDICINE

## 2024-01-11 PROCEDURE — 86901 BLOOD TYPING SEROLOGIC RH(D): CPT | Performed by: EMERGENCY MEDICINE

## 2024-01-11 PROCEDURE — 70450 CT HEAD/BRAIN W/O DYE: CPT

## 2024-01-11 PROCEDURE — 0042T HC CT CEREBRAL PERFUSION W/WO CONTRAST: CPT

## 2024-01-11 PROCEDURE — 70498 CT ANGIOGRAPHY NECK: CPT

## 2024-01-11 PROCEDURE — 99285 EMERGENCY DEPT VISIT HI MDM: CPT

## 2024-01-11 PROCEDURE — 85610 PROTHROMBIN TIME: CPT | Performed by: EMERGENCY MEDICINE

## 2024-01-11 PROCEDURE — 71045 X-RAY EXAM CHEST 1 VIEW: CPT

## 2024-01-11 PROCEDURE — 25010000002 LEVETIRACETAM IN NACL 0.75% 1000 MG/100ML SOLUTION: Performed by: EMERGENCY MEDICINE

## 2024-01-11 PROCEDURE — 85730 THROMBOPLASTIN TIME PARTIAL: CPT | Performed by: EMERGENCY MEDICINE

## 2024-01-11 PROCEDURE — 87637 SARSCOV2&INF A&B&RSV AMP PRB: CPT | Performed by: EMERGENCY MEDICINE

## 2024-01-11 PROCEDURE — 86850 RBC ANTIBODY SCREEN: CPT | Performed by: EMERGENCY MEDICINE

## 2024-01-11 PROCEDURE — 82948 REAGENT STRIP/BLOOD GLUCOSE: CPT

## 2024-01-11 PROCEDURE — 93005 ELECTROCARDIOGRAM TRACING: CPT | Performed by: EMERGENCY MEDICINE

## 2024-01-11 PROCEDURE — 96374 THER/PROPH/DIAG INJ IV PUSH: CPT

## 2024-01-11 RX ORDER — LEVETIRACETAM 10 MG/ML
1000 INJECTION INTRAVASCULAR ONCE
Status: COMPLETED | OUTPATIENT
Start: 2024-01-11 | End: 2024-01-11

## 2024-01-11 RX ORDER — LEVETIRACETAM 500 MG/1
500 TABLET ORAL 2 TIMES DAILY
Qty: 60 TABLET | Refills: 0 | Status: SHIPPED | OUTPATIENT
Start: 2024-01-11

## 2024-01-11 RX ORDER — SODIUM CHLORIDE 0.9 % (FLUSH) 0.9 %
10 SYRINGE (ML) INJECTION AS NEEDED
Status: DISCONTINUED | OUTPATIENT
Start: 2024-01-11 | End: 2024-01-12 | Stop reason: HOSPADM

## 2024-01-11 RX ADMIN — IOPAMIDOL 80 ML: 755 INJECTION, SOLUTION INTRAVENOUS at 19:55

## 2024-01-11 RX ADMIN — LEVETIRACETAM 1000 MG: 10 INJECTION, SOLUTION INTRAVENOUS at 20:28

## 2024-01-11 RX ADMIN — IOPAMIDOL 100 ML: 755 INJECTION, SOLUTION INTRAVENOUS at 20:03

## 2024-01-12 LAB
QT INTERVAL: 439 MS
QTC INTERVAL: 479 MS

## 2024-01-12 NOTE — ED PROVIDER NOTES
"Time: 7:40 PM EST  Date of encounter:  1/11/2024  Independent Historian/Clinical History and Information was obtained by:   EMS    History is limited by: Dementia    Chief Complaint: Altered mental status, evaluation for CVA      History of Present Illness:  Patient is a 83 y.o. year old female who presents to the emergency department for evaluation of altered mental status from assisted living facility.  Patient has known dementia.  Per EMS report she was sitting at dinner when she suddenly became unresponsive.  There was no syncopal event or fall, she just quit responding.  Patient provides no history at this time.    [facility] [place]    Patient Care Team  Primary Care Provider: Mike Cordoba MD    Past Medical History:     No Known Allergies  Past Medical History:   Diagnosis Date    Alzheimer disease      History reviewed. No pertinent surgical history.  Family History   Family history unknown: Yes       Home Medications:  Prior to Admission medications    Medication Sig Start Date End Date Taking? Authorizing Provider   donepezil (ARICEPT) 10 MG tablet Take 10 mg by mouth Every Night.    ProviderAnant MD   montelukast (SINGULAIR) 10 MG tablet Take 10 mg by mouth Every Night.    ProviderAnant MD   QUEtiapine (SEROquel) 50 MG tablet Take 50 mg by mouth Every Night.    ProviderAnant MD   vilazodone (VIIBRYD) 20 MG tablet tablet Take 20 mg by mouth Daily.    ProviderAnant MD        Social History:   Social History     Tobacco Use    Smoking status: Never    Smokeless tobacco: Never   Vaping Use    Vaping Use: Never used   Substance Use Topics    Alcohol use: Never    Drug use: Never         Review of Systems:  Review of Systems   Unable to perform ROS: Mental status change        Physical Exam:  /70   Pulse 69   Temp 98 °F (36.7 °C) (Oral)   Resp 15   Ht 162.6 cm (64\")   Wt 65.3 kg (143 lb 15.4 oz)   SpO2 95%   BMI 24.71 kg/m²     Physical Exam  Vitals and nursing note reviewed. "   Constitutional:       General: She is awake.   HENT:      Head: Normocephalic and atraumatic.      Nose: Nose normal.      Mouth/Throat:      Mouth: Mucous membranes are moist.   Eyes:      Extraocular Movements: Extraocular movements intact.      Pupils: Pupils are equal, round, and reactive to light.   Cardiovascular:      Rate and Rhythm: Normal rate and regular rhythm.      Heart sounds: Normal heart sounds.   Pulmonary:      Effort: Pulmonary effort is normal. No respiratory distress.      Breath sounds: Normal breath sounds. No wheezing, rhonchi or rales.   Abdominal:      General: Bowel sounds are normal.      Palpations: Abdomen is soft.      Tenderness: There is no abdominal tenderness. There is no guarding or rebound.      Comments: No rigidity   Musculoskeletal:         General: No tenderness. Normal range of motion.      Cervical back: Normal range of motion and neck supple.   Skin:     General: Skin is warm and dry.      Coloration: Skin is not jaundiced.   Neurological:      General: No focal deficit present.      Cranial Nerves: No cranial nerve deficit.      Comments: Patient not verbally responding.  She does not appear lethargic but instead just nonverbal and not making eye contact.                  Procedures:  Procedures      Medical Decision Making:      Comorbidities that affect care:    Alzheimer's dementia    External Notes reviewed:    Previous Clinic Note: Primary care visit with Dr. TOMASA Cordoba on 7/9/2021.  Visit diagnoses as follows: Dizziness, senile dementia without behavioral disturbance      The following orders were placed and all results were independently analyzed by me:  Orders Placed This Encounter   Procedures    COVID-19, FLU A/B, RSV PCR 1 HR TAT - Swab, Nasopharynx    CT Head Without Contrast Stroke Protocol    CT Angiogram Head w AI Analysis of LVO    CT Angiogram Neck    CT CEREBRAL PERFUSION WITH & WITHOUT CONTRAST    XR Chest 1 View    [place] Draw    Comprehensive  Metabolic Panel    Protime-INR    aPTT    Single High Sensitivity Troponin T    Urinalysis With Microscopic If Indicated (No Culture) - Urine, Clean Catch    CBC Auto Differential    Urinalysis, Microscopic Only - Urine, Clean Catch    NPO Diet NPO Type: Strict NPO    Initiate Department's Acute Stroke Process (Team D, Code 19, etc)    Perform NIH Stroke Scale    Measure Actual Weight    Head of Bed 30 Degrees or Less    Undress and Gown    Continuous Pulse Oximetry    Vital Signs    Neuro Checks    Notify MD for SBP < 80 or > 200    Notify Provider for SBP greater than 140 if hemorrhagic Stroke    No Hypotonic Fluids    Nursing Dysphagia Screening (Complete Prior to Giving anything PO)    RN to Place Order SLP Consult (IF swallow screen failed) - Eval & Treat Choosing Reason of RN Dysphagia Screen Failed    Straight Cath    Oxygen Therapy- Nasal Cannula; Titrate 1-6 LPM Per SpO2; 90 - 95%    POC Glucose Once    POC Glucose Once    ECG 12 Lead ED Triage Standing Order; Acute Stroke (Onset <12 hrs)    Type & Screen    ABO RH Specimen Verification    Insert Large Bore Peripheral IV - Right AC Preferred    CBC & Differential    Green Top (Gel)    Lavender Top    Gold Top - SST    Light Blue Top       Medications Given in the Emergency Department:  Medications   sodium chloride 0.9 % flush 10 mL (has no administration in time range)   iopamidol (ISOVUE-370) 76 % injection 100 mL (80 mL Intravenous Given 1/11/24 1955)   iopamidol (ISOVUE-370) 76 % injection 100 mL (100 mL Intravenous Given 1/11/24 2003)   levETIRAcetam in NaCl 0.75% (KEPPRA) IVPB 1,000 mg (0 mg Intravenous Stopped 1/11/24 2112)        ED Course:    ED Course as of 01/11/24 2157   Thu Jan 11, 2024 2123 I have personally interpreted the EKG today and it shows no evidence of any acute ischemia or heart arrhythmia. [RP]   2151 Long discussion with son at bedside.  Patient has advanced dementia at baseline.  Son prefers for me to discharge.  She is quite  agitated here being in the emergency department and he is concerned with how she would do in the hospital.  At this time she is at her baseline per family.  Son is aware that I cannot rule out stroke or seizure, however in discussing risk/benefits of admission he feel she is best served being discharged back to the facility. [RP]      ED Course User Index  [RP] Jose Antonio Talamantes MD       Labs:    Lab Results (last 24 hours)       Procedure Component Value Units Date/Time    POC Glucose Once [[id number]]  (Abnormal) Collected: 01/11/24 1933    Specimen: Blood Updated: 01/11/24 1935     Glucose 118 mg/dL      Comment: Serial Number: [id number]:  [id number]       CBC & Differential [[id number]]  (Abnormal) Collected: 01/11/24 1936    Specimen: Blood from Arm, Left Updated: 01/11/24 1952    Narrative:      The following orders were created for panel order CBC & Differential.  Procedure                               Abnormality         Status                     ---------                               -----------         ------                     CBC Auto Differential[[id number]]        Abnormal            Final result                 Please view results for these tests on the individual orders.    Comprehensive Metabolic Panel [[id number]]  (Abnormal) Collected: 01/11/24 1936    Specimen: Blood from Arm, Left Updated: 01/11/24 2015     Glucose 91 mg/dL      BUN 11 mg/dL      Creatinine 1.01 mg/dL      Sodium 140 mmol/L      Potassium 4.6 mmol/L      Chloride 103 mmol/L      CO2 27.6 mmol/L      Calcium 8.9 mg/dL      Total Protein 6.3 g/dL      Albumin 3.8 g/dL      ALT (SGPT) 8 U/L      AST (SGOT) 13 U/L      Alkaline Phosphatase 82 U/L      Total Bilirubin 0.3 mg/dL      Globulin 2.5 gm/dL      A/G Ratio 1.5 g/dL      BUN/Creatinine Ratio 10.9     Anion Gap 9.4 mmol/L      eGFR 56.0 mL/min/1.73     Narrative:      GFR Normal >60  Chronic Kidney Disease <60  Kidney Failure <15    The GFR formula is only valid for  adults with stable renal function between ages 18 and 70.    Protime-INR [[id number]]  (Normal) Collected: 01/11/24 1936    Specimen: Blood from Arm, Left Updated: 01/11/24 2004     Protime 13.0 Seconds      INR 0.97    Narrative:      Suggested Therapeutic Ranges For Oral Anticoagulant Therapy:  Level of Therapy                      INR Target Range  Standard Dose                            2.0-3.0  High Dose                                2.5-3.5  Patients not receiving anticoagulant  Therapy Normal Range                     0.86-1.15    aPTT [[id number]]  (Normal) Collected: 01/11/24 1936    Specimen: Blood from Arm, Left Updated: 01/11/24 2004     PTT 27.2 seconds     Single High Sensitivity Troponin T [[id number]]  (Abnormal) Collected: 01/11/24 1936    Specimen: Blood from Arm, Left Updated: 01/11/24 2015     HS Troponin T 15 ng/L     Narrative:      High Sensitive Troponin T Reference Range:  <14.0 ng/L- Negative Female for AMI  <22.0 ng/L- Negative Male for AMI  >=14 - Abnormal Female indicating possible myocardial injury.  >=22 - Abnormal Male indicating possible myocardial injury.   Clinicians would have to utilize clinical acumen, EKG, Troponin, and serial changes to determine if it is an Acute Myocardial Infarction or myocardial injury due to an underlying chronic condition.         CBC Auto Differential [[id number]]  (Abnormal) Collected: 01/11/24 1936    Specimen: Blood from Arm, Left Updated: 01/11/24 1952     WBC 7.70 10*3/mm3      RBC 4.37 10*6/mm3      Hemoglobin 13.1 g/dL      Hematocrit 41.9 %      MCV 95.9 fL      MCH 30.0 pg      MCHC 31.3 g/dL      RDW 13.9 %      RDW-SD 49.5 fl      MPV 10.9 fL      Platelets 211 10*3/mm3      Neutrophil % 48.0 %      Lymphocyte % 36.1 %      Monocyte % 13.1 %      Eosinophil % 2.2 %      Basophil % 0.5 %      Immature Grans % 0.1 %      Neutrophils, Absolute 3.69 10*3/mm3      Lymphocytes, Absolute 2.78 10*3/mm3      Monocytes, Absolute 1.01 10*3/mm3       Eosinophils, Absolute 0.17 10*3/mm3      Basophils, Absolute 0.04 10*3/mm3      Immature Grans, Absolute 0.01 10*3/mm3      nRBC 0.0 /100 WBC     Urinalysis With Microscopic If Indicated (No Culture) - Urine, Clean Catch [[id number]]  (Abnormal) Collected: 01/11/24 2018    Specimen: Urine, Clean Catch Updated: 01/11/24 2038     Color, UA Yellow     Appearance, UA Clear     pH, UA 6.0     Specific Gravity, UA >1.030     Glucose, UA Negative     Ketones, UA Negative     Bilirubin, UA Negative     Blood, UA Negative     Protein, UA Negative     Leuk Esterase, UA Trace     Nitrite, UA Negative     Urobilinogen, UA 1.0 E.U./dL    COVID-19, FLU A/B, RSV PCR 1 HR TAT - Swab, Nasopharynx [[id number]]  (Normal) Collected: 01/11/24 2018    Specimen: Swab from Nasopharynx Updated: 01/11/24 2101     COVID19 Not Detected     Influenza A PCR Not Detected     Influenza B PCR Not Detected     RSV, PCR Not Detected    Narrative:      Fact sheet for providers: https://www.fda.gov/media/845023/download    Fact sheet for patients: https://www.fda.gov/media/673730/download    Test performed by PCR.    Urinalysis, Microscopic Only - Urine, Clean Catch [[id number]]  (Abnormal) Collected: 01/11/24 2018    Specimen: Urine, Clean Catch Updated: 01/11/24 2103     RBC, UA None Seen /HPF      WBC, UA 3-5 /HPF      Bacteria, UA None Seen /HPF      Squamous Epithelial Cells, UA 0-2 /HPF      Hyaline Casts, UA None Seen /LPF      Methodology Automated Microscopy             Imaging:    CT Angiogram Head w AI Analysis of LVO    Result Date: 1/11/2024  PROCEDURE: CT ANGIOGRAM HEAD W AI ANALYSIS OF LVO, 1/11/2024, 19:55 CT ANGIOGRAM NECK, 1/11/2024, 19:55  COMPARISON: [facility], CT, CT CHEST ABD PEL W CONTRAST, 11/02/2020, 0:39.  [facility], CT, CT HEAD WO CONTRAST STROKE PROTOCOL, 1/11/2024, 19:39.  INDICATIONS: Neuro deficit, acute, stroke suspected,  unresponsive, possible right sided facial droop  PROTOCOL:   Standard  imaging protocol performed    RADIATION:   DLP: 417.1mGy*cm   Automated exposure control was utilized to minimize radiation dose. CONTRAST: 100cc RAPID: CTA imaging was analyzed using RAPID AI to enable computer assisted triage notification to rapidly detect a large vessel occlusion (LVO) and shorten notification time  TECHNIQUE: After obtaining the patient's consent, CT images of the head and neck were obtained without and with non-ionic contrast, and multi-planar/3-D imaging were created and interpreted to optimize visualization of vascular anatomy.   FINDINGS:  Vascular:  Aortic arch is widely patent.  The right innominate artery, right subclavian artery and left subclavian artery are unremarkable.  Left vertebral artery arises directly from the aortic arch is somewhat tortuous at the base of the neck.  Tortuous course of the vertebral artery is patent to the skull base.  Right vertebral artery is similar in size to the left and arises from the subclavian artery is patent to the base of the skull.  Right common carotid artery is patent.  Bifurcation is unremarkable.  There is tortuosity of the internal carotid artery.  Left common carotid artery is tortuous and patent.  Bifurcation and external carotid artery demonstrate no acute abnormality.  Internal carotid artery is tortuous and patent to the base of the skull CT angiogram head:  The mild atherosclerotic changes noted within the cavernous segments of the internal carotid arteries bilaterally.  The proximal portion of the left A1 segment demonstrates moderate to severe stenosis.  Left A1 segment appears widely patent.  Anterior cerebral arteries otherwise opacify unremarkably.  The middle cerebral arteries appear grossly unremarkable in appearance.  Posterior circulation demonstrates patency of the vertebral arteries and basilar artery.  Posterior communicating arteries supply significant portions of the posterior cerebral arteries bilaterally.  There appears  to be significant narrowing of the proximal P1 segment just after the posterior communicating artery on the right.  Flow is noted distally.  This is limited by motion  Nonvascular:  Limited imaging of the intracranial contents demonstrates diffuse atrophy.  No suspicious enhancement noted.  Chronic white matter changes noted.  Limited imaging of the orbits and the soft tissues of the head neck are grossly unremarkable on motion limited imaging.  Limited imaging of the chest demonstrates incomplete evaluation of a large diaphragmatic hernia with associated vascular crowding in associated consolidation likely atelectasis noted dependently.  There is some peribronchial wall thickening and some perihilar ground-glass opacities additionally noted which could represent atelectasis or pneumonia.  Multilevel degenerative changes are noted of the spine        1. No evidence of large vessel occlusion 2. Moderate to severe narrowing at the proximal portion of the right A1 segment of the anterior cerebral artery.  Posterior communicating arteries supplied a predominant supply of the PCAs bilaterally.  There is moderate severe narrowing of the proximal segment on the right just after confluence with the right posterior communicating artery.  This is somewhat limited by motion.  No evidence of significant stenosis or aneurysm formation otherwise noted .  3. Marked tortuosity of the vessels of the neck without evidence of large vessel occlusion or aneurysm formation 4. Large diaphragmatic hernia 5. MRI could always be considered to further evaluate if clinically indicated for acute ischemic change     АННА TIRADO MD       Electronically Signed and Approved By: АННА TIRADO MD on 1/11/2024 at 21:16             CT Angiogram Neck    Result Date: 1/11/2024  PROCEDURE: CT ANGIOGRAM HEAD W AI ANALYSIS OF LVO, 1/11/2024, 19:55 CT ANGIOGRAM NECK, 1/11/2024, 19:55  COMPARISON: [facility], CT, CT CHEST ABD PEL W  CONTRAST, 11/02/2020, 0:39.  [facility], CT, CT HEAD WO CONTRAST STROKE PROTOCOL, 1/11/2024, 19:39.  INDICATIONS: Neuro deficit, acute, stroke suspected,  unresponsive, possible right sided facial droop  PROTOCOL:   Standard imaging protocol performed    RADIATION:   DLP: 417.1mGy*cm   Automated exposure control was utilized to minimize radiation dose. CONTRAST: 100cc RAPID: CTA imaging was analyzed using RAPID AI to enable computer assisted triage notification to rapidly detect a large vessel occlusion (LVO) and shorten notification time  TECHNIQUE: After obtaining the patient's consent, CT images of the head and neck were obtained without and with non-ionic contrast, and multi-planar/3-D imaging were created and interpreted to optimize visualization of vascular anatomy.   FINDINGS:  Vascular:  Aortic arch is widely patent.  The right innominate artery, right subclavian artery and left subclavian artery are unremarkable.  Left vertebral artery arises directly from the aortic arch is somewhat tortuous at the base of the neck.  Tortuous course of the vertebral artery is patent to the skull base.  Right vertebral artery is similar in size to the left and arises from the subclavian artery is patent to the base of the skull.  Right common carotid artery is patent.  Bifurcation is unremarkable.  There is tortuosity of the internal carotid artery.  Left common carotid artery is tortuous and patent.  Bifurcation and external carotid artery demonstrate no acute abnormality.  Internal carotid artery is tortuous and patent to the base of the skull CT angiogram head:  The mild atherosclerotic changes noted within the cavernous segments of the internal carotid arteries bilaterally.  The proximal portion of the left A1 segment demonstrates moderate to severe stenosis.  Left A1 segment appears widely patent.  Anterior cerebral arteries otherwise opacify unremarkably.  The middle cerebral arteries appear grossly  unremarkable in appearance.  Posterior circulation demonstrates patency of the vertebral arteries and basilar artery.  Posterior communicating arteries supply significant portions of the posterior cerebral arteries bilaterally.  There appears to be significant narrowing of the proximal P1 segment just after the posterior communicating artery on the right.  Flow is noted distally.  This is limited by motion  Nonvascular:  Limited imaging of the intracranial contents demonstrates diffuse atrophy.  No suspicious enhancement noted.  Chronic white matter changes noted.  Limited imaging of the orbits and the soft tissues of the head neck are grossly unremarkable on motion limited imaging.  Limited imaging of the chest demonstrates incomplete evaluation of a large diaphragmatic hernia with associated vascular crowding in associated consolidation likely atelectasis noted dependently.  There is some peribronchial wall thickening and some perihilar ground-glass opacities additionally noted which could represent atelectasis or pneumonia.  Multilevel degenerative changes are noted of the spine        1. No evidence of large vessel occlusion 2. Moderate to severe narrowing at the proximal portion of the right A1 segment of the anterior cerebral artery.  Posterior communicating arteries supplied a predominant supply of the PCAs bilaterally.  There is moderate severe narrowing of the proximal segment on the right just after confluence with the right posterior communicating artery.  This is somewhat limited by motion.  No evidence of significant stenosis or aneurysm formation otherwise noted .  3. Marked tortuosity of the vessels of the neck without evidence of large vessel occlusion or aneurysm formation 4. Large diaphragmatic hernia 5. MRI could always be considered to further evaluate if clinically indicated for acute ischemic change     АННА TIRADO MD       Electronically Signed and Approved By: АННА TIRADO MD on  1/11/2024 at 21:16             XR Chest 1 View    Result Date: 1/11/2024  PROCEDURE: XR CHEST 1 VW  COMPARISON: None  INDICATIONS: Acute Stroke Protocol (Onset < 12 hrs)  FINDINGS:  There is a very large diaphragmatic hernia with associated atelectasis and vascular crowding at the lung bases.  Hernia obscures the mediastinum and limits evaluation of heart size which appears to be grossly within normal limits.  There is evidence of old granulomatous disease.  Osseous structures are unremarkable        1. Large diaphragmatic hernia with associated atelectasis at the lung bases       АННА TIRADO MD       Electronically Signed and Approved By: АННА TIRADO MD on 1/11/2024 at 20:38             CT CEREBRAL PERFUSION WITH & WITHOUT CONTRAST    Result Date: 1/11/2024  PROCEDURE: CT CEREBRAL PERFUSION W WO CONTRAST  COMPARISON: [facility], CT, CT ANGIOGRAM HEAD W AI ANALYSIS OF LVO, 1/11/2024, 19:55.  INDICATIONS: Neuro deficit, acute, stroke suspected, unresponsive, possible right sided facial droop  PROTOCOL:   Standard imaging protocol performed    RADIATION:   DLP: 1299.6mGy*cm   Automated exposure control was utilized to minimize radiation dose.   FINDINGS:  Study appears to be technically adequate. Cerebral blood flow less than 30%:  0 mL Total T-max greater than 6.0 seconds:  0 mL Total mismatch difference 0 mL Total mismatch ratio:  None        1. Normal perfusion study      АННА TIRADO MD       Electronically Signed and Approved By: АННА TIRADO MD on 1/11/2024 at 20:18             CT Head Without Contrast Stroke Protocol    Result Date: 1/11/2024  PROCEDURE: CT HEAD WO CONTRAST STROKE PROTOCOL  COMPARISON:  [facility], CT, HEAD W/O CONTRAST, 11/02/2020, 0:36. INDICATIONS: Neuro deficit, acute, stroke suspected, unresponsive, possible right facial droop  PROTOCOL:   Standard imaging protocol performed    RADIATION:   DLP: 1018.2mGy*cm   MA and/or KV was adjusted to  minimize radiation dose.     TECHNIQUE: After obtaining the patient's consent, CT images were obtained without non-ionic intravenous contrast material.  FINDINGS:  Brain:  There is no acute intracranial hemorrhage or mass effect.  There is diffuse atrophy.  White matter changes compatible small-vessel ischemic disease in this age group noted.  Orbits:  Orbits are grossly unremarkable and periorbital soft tissues appear grossly unremarkable.  Sinuses:  Paranasal sinuses are clear.  Mastoid air cells are clear.  Calvarium and soft tissues:  Bony calvarium is intact.  Soft tissues are grossly unremarkable in appearance.         1. No acute intracranial abnormality 2. Diffuse atrophy and white matter changes not significantly changed given differences in technique to the comparison  Findings called to Dr. Talamantes at 7:54 p.m.     АННА TIRADO MD       Electronically Signed and Approved By: АННА TIRADO MD on 1/11/2024 at 19:59                Differential Diagnosis and Discussion:    Altered Mental Status: Based on the patient's signs and symptoms, differential diagnosis includes but is not limited to meningitis, stroke, sepsis, subarachnoid hemorrhage, intracranial bleeding, encephalitis, and metabolic encephalopathy.    All labs were reviewed and interpreted by me.  All X-rays impressions were independently interpreted by me.  EKG was interpreted by me.  CT scan radiology impression was interpreted by me.    MDM     Amount and/or Complexity of Data Reviewed  Clinical lab tests: reviewed  Tests in the radiology section of CPT®: reviewed  Decide to obtain previous medical records or to obtain history from someone other than the patient: yes                 Patient Care Considerations:    MRI: I considered ordering an MRI however patient has advanced dementia.  She is quite agitated and family does not want her sedated to perform an MRI.      Consultants/Shared Management Plan:    Consultant: I have discussed  the case with teleneurology on-call who states this seems more likely seizure related.  CT perfusion scan is clear.  Recommends loading with Keppra and admission for further workup.    Social Determinants of Health:    Patient is a nursing home/assisted living resident and has reliable access to care.      Disposition and Care Coordination:    I advised the patient that in my opinion through evaluation of the history, physical, and objective data admission to the hospital is warranted. However, the patient/caretaker has declined admission understanding the risks of discharge.        Final diagnoses:   Altered mental status, unspecified altered mental status type   Severe Alzheimer's dementia, unspecified timing of dementia onset, unspecified whether behavioral, psychotic, or mood disturbance or anxiety        ED Disposition       ED Disposition   Discharge    Condition   Stable    Comment   --               This medical record created using voice recognition software.             Jose Antonio Talamantes MD  01/11/24 [id number]

## 2024-01-12 NOTE — CONSULTS
[facility]  [facility]      Patient Name:   marcos leyva  YOB: 1940  Identification Number:   MRN - [id number]  Date of Service:   01/11/2024 19:32:02    Diagnosis:        R41.82 - AMS (Altered Mental Status)    Impression:       Patient is non-verbal and not following commands. She has not focal weakness and drift in all extremities. With the description of the onset of symptoms suspect seizure. Recommend starting Keppra and admitting for further work up.    Our recommendations are outlined below.    Recommendations:          Neuro Checks        EEG        Seizure precautions        Load with Keppra 1000mg and start 500mg bid    Recommended Scan:       MRI Head with and Without Contrast   (Concern for other intracranial pathology that requires MRI brain with contrast)    Therapies:        Physical Therapy, Occupational Therapy, Speech Therapy Assessment When Applicable    Dysphagia:        Swallow Evaluation, Bedside        NPO Until Swallow Evaluation    DVT prophylaxis:        Choice of Primary Team    Disposition:        Neurology Follow Up Recommended    Sign Out:        Discussed with Emergency Department Provider        ------------------------------------------------------------------------------    Advanced Imaging:  CTA Head and Neck Completed.    CTP Completed.    LVO:No    Patient in not a candidate for JEANIE      Metrics:  Last Known Well: 01/11/2024 18:00:00  [facility] Notification Time: 01/11/2024 19:30:30  Arrival Time: 01/11/2024 19:30:00  Stamp Time: 01/11/2024 19:32:02  Initial Response Time: 01/11/2024 19:37:06  Symptoms: mental status change.  Initial patient interaction: 01/11/2024 19:40:23  NIHSS Assessment Completed: 01/11/2024 19:46:58  Patient is not a candidate for Thrombolytic.  Thrombolytic Medical Decision: 01/11/2024 19:53:22  Patient was not deemed candidate for Thrombolytic because of following reasons:  no focal  deficits, suspicion for seizure.    CT head showed no acute hemorrhage or acute core infarct.    Primary Provider Notified of Diagnostic Impression and Management Plan on: 01/11/2024 20:11:59        ------------------------------------------------------------------------------    History of Present Illness:  Patient is a 83 year old Female.    Patient was brought by EMS for symptoms of mental status change.  82 yo F with history of dementia who is presenting with mental status change. She was last seen normal at 18:00 became altered in the dining room at her nursing facility.    Per staff at her facility. She is normally talking and interactive. She walks around. She talks to the mirror sometimes. She was eating and talking normally at dinner at 18:00. The caretaker was washing dishes and she was making weird noises. Her eyes were looking straight up and making a strange noise. She was not responsive. Her face was cold.      Past Medical History:  Othere PMH:  dementia  unable to obtain due to:   Patient Cannot Speak    Medications:    No Anticoagulant use   No Antiplatelet use  Reviewed EMR for current medications    Allergies:   NKDA  Allergies Unable To Obtain Due To: Patient Cannot Speak    Social History:  Unable To Obtain Due To Patient Status : Patient Cannot Speak    Family History:    Family History Cannot Be Obtained Because:Patient Cannot Speak    ROS : ROS Cannot Be Obtained Because:  Patient Cannot Speak    Past Surgical History:  Past Surgical History Cannot Be Obtained Because: Patient Cannot Speak  There Is No Surgical History Contributory To Today’s Visit        Examination:  BP(121/66), Pulse(64),  1A: Level of Consciousness - Alert; keenly responsive + 0  1B: Ask Month and Age - Could Not Answer Either Question Correctly + 2  1C: Blink Eyes & Squeeze Hands - Performs 0 Tasks + 2  2: Test Horizontal Extraocular Movements - Normal + 0  3: Test Visual Fields - No Visual Loss + 0  4: Test Facial Palsy  (Use Grimace if Obtunded) - Normal symmetry + 0  5A: Test Left Arm Motor Drift - Drift, hits bed + 2  5B: Test Right Arm Motor Drift - Drift, hits bed + 2  6A: Test Left Leg Motor Drift - No Effort Against Gravity + 3  6B: Test Right Leg Motor Drift - No Effort Against Gravity + 3  7: Test Limb Ataxia (FNF/Heel-Shin) - No Ataxia + 0  8: Test Sensation - Normal; No sensory loss + 0  9: Test Language/Aphasia - Mute/Global Aphasia: No Usable Speech/Auditory Comprehension + 3  10: Test Dysarthria - Mute/Anarthric + 2  11: Test Extinction/Inattention - No abnormality + 0    NIHSS Score: 19    Pre-Morbid Modified [place] Scale:  3 Points = Moderate disability; requiring some help, but able to walk without assistance    Spoke with : Dr Ortega  I reviewed the available imaging via [facility] and initiated discussion with the primary provider    Patient/Family was informed the Neurology Consult would occur via TeleHealth consult by way of interactive audio and video telecommunications and consented to receiving care in this manner.      Patient is being evaluated for possible acute neurologic impairment and high probability of imminent or life-threatening deterioration. I spent total of 35 minutes providing care to this patient, including time for face to face visit via telemedicine, review of medical records, imaging studies and discussion of findings with providers, the patient and/or family.      Dr Katarina Felix      [facility]  For Inpatient follow-up with [facility] physician please call [facility] [phone]. This is not an outpatient service. Post hospital discharge, please contact hospital directly.    Please do not communicate with [facility] physicians via secure chat. If you have any questions, Please contact [facility].

## 2024-01-12 NOTE — DISCHARGE INSTRUCTIONS
Return to the emergency department immediately for worsening of symptoms.  As we discussed, the safest thing would be to admit your mother to the hospital for further testing.  Unfortunately with her advanced dementia she is agitated and as you described, would not do well in the hospital.  She would not lay still for an MRI to rule out stroke.  The neurologist feels this would be more likely a seizure as opposed to stroke.  I will start her on some antiseizure medications.

## 2024-01-30 ENCOUNTER — APPOINTMENT (OUTPATIENT)
Dept: CT IMAGING | Facility: HOSPITAL | Age: 84
DRG: 380 | End: 2024-01-30
Payer: MEDICARE

## 2024-01-30 ENCOUNTER — HOSPITAL ENCOUNTER (INPATIENT)
Facility: HOSPITAL | Age: 84
LOS: 2 days | Discharge: NURSING, REHAB OR LONG-TERM CARE | DRG: 380 | End: 2024-02-03
Attending: EMERGENCY MEDICINE | Admitting: FAMILY MEDICINE
Payer: MEDICARE

## 2024-01-30 ENCOUNTER — APPOINTMENT (OUTPATIENT)
Dept: GENERAL RADIOLOGY | Facility: HOSPITAL | Age: 84
DRG: 380 | End: 2024-01-30
Payer: MEDICARE

## 2024-01-30 DIAGNOSIS — K44.9 HIATAL HERNIA: ICD-10-CM

## 2024-01-30 DIAGNOSIS — K59.00 CONSTIPATION, UNSPECIFIED CONSTIPATION TYPE: ICD-10-CM

## 2024-01-30 DIAGNOSIS — R10.84 GENERALIZED ABDOMINAL PAIN: Primary | ICD-10-CM

## 2024-01-30 LAB
ALBUMIN SERPL-MCNC: 4.1 G/DL (ref 3.5–5.2)
ALBUMIN/GLOB SERPL: 1.2 G/DL
ALP SERPL-CCNC: 77 U/L (ref 39–117)
ALT SERPL W P-5'-P-CCNC: 12 U/L (ref 1–33)
ANION GAP SERPL CALCULATED.3IONS-SCNC: 11.1 MMOL/L (ref 5–15)
AST SERPL-CCNC: 14 U/L (ref 1–32)
BASOPHILS # BLD AUTO: 0.01 10*3/MM3 (ref 0–0.2)
BASOPHILS NFR BLD AUTO: 0.1 % (ref 0–1.5)
BILIRUB SERPL-MCNC: 0.4 MG/DL (ref 0–1.2)
BUN SERPL-MCNC: 14 MG/DL (ref 8–23)
BUN/CREAT SERPL: 15.6 (ref 7–25)
CALCIUM SPEC-SCNC: 9.3 MG/DL (ref 8.6–10.5)
CHLORIDE SERPL-SCNC: 102 MMOL/L (ref 98–107)
CO2 SERPL-SCNC: 27.9 MMOL/L (ref 22–29)
CREAT SERPL-MCNC: 0.9 MG/DL (ref 0.57–1)
DEPRECATED RDW RBC AUTO: 50.7 FL (ref 37–54)
EGFRCR SERPLBLD CKD-EPI 2021: 64.4 ML/MIN/1.73
EOSINOPHIL # BLD AUTO: 0 10*3/MM3 (ref 0–0.4)
EOSINOPHIL NFR BLD AUTO: 0 % (ref 0.3–6.2)
ERYTHROCYTE [DISTWIDTH] IN BLOOD BY AUTOMATED COUNT: 14.6 % (ref 12.3–15.4)
FLUAV SUBTYP SPEC NAA+PROBE: NOT DETECTED
FLUBV RNA ISLT QL NAA+PROBE: NOT DETECTED
GLOBULIN UR ELPH-MCNC: 3.4 GM/DL
GLUCOSE BLDC GLUCOMTR-MCNC: 113 MG/DL (ref 70–99)
GLUCOSE SERPL-MCNC: 139 MG/DL (ref 65–99)
HCT VFR BLD AUTO: 43.7 % (ref 34–46.6)
HGB BLD-MCNC: 13.9 G/DL (ref 12–15.9)
HOLD SPECIMEN: NORMAL
HOLD SPECIMEN: NORMAL
IMM GRANULOCYTES # BLD AUTO: 0.03 10*3/MM3 (ref 0–0.05)
IMM GRANULOCYTES NFR BLD AUTO: 0.3 % (ref 0–0.5)
LYMPHOCYTES # BLD AUTO: 0.68 10*3/MM3 (ref 0.7–3.1)
LYMPHOCYTES NFR BLD AUTO: 7.8 % (ref 19.6–45.3)
MCH RBC QN AUTO: 30.1 PG (ref 26.6–33)
MCHC RBC AUTO-ENTMCNC: 31.8 G/DL (ref 31.5–35.7)
MCV RBC AUTO: 94.6 FL (ref 79–97)
MONOCYTES # BLD AUTO: 0.25 10*3/MM3 (ref 0.1–0.9)
MONOCYTES NFR BLD AUTO: 2.9 % (ref 5–12)
NEUTROPHILS NFR BLD AUTO: 7.8 10*3/MM3 (ref 1.7–7)
NEUTROPHILS NFR BLD AUTO: 88.9 % (ref 42.7–76)
NRBC BLD AUTO-RTO: 0 /100 WBC (ref 0–0.2)
NT-PROBNP SERPL-MCNC: 195.8 PG/ML (ref 0–1800)
PLATELET # BLD AUTO: 220 10*3/MM3 (ref 140–450)
PMV BLD AUTO: 11.1 FL (ref 6–12)
POTASSIUM SERPL-SCNC: 4 MMOL/L (ref 3.5–5.2)
PROT SERPL-MCNC: 7.5 G/DL (ref 6–8.5)
RBC # BLD AUTO: 4.62 10*6/MM3 (ref 3.77–5.28)
RSV RNA NPH QL NAA+NON-PROBE: NOT DETECTED
SARS-COV-2 RNA RESP QL NAA+PROBE: NOT DETECTED
SODIUM SERPL-SCNC: 141 MMOL/L (ref 136–145)
TROPONIN T SERPL HS-MCNC: 12 NG/L
WBC NRBC COR # BLD AUTO: 8.77 10*3/MM3 (ref 3.4–10.8)
WHOLE BLOOD HOLD COAG: NORMAL
WHOLE BLOOD HOLD SPECIMEN: NORMAL

## 2024-01-30 PROCEDURE — 25010000002 ONDANSETRON PER 1 MG: Performed by: EMERGENCY MEDICINE

## 2024-01-30 PROCEDURE — 99285 EMERGENCY DEPT VISIT HI MDM: CPT

## 2024-01-30 PROCEDURE — 93005 ELECTROCARDIOGRAM TRACING: CPT

## 2024-01-30 PROCEDURE — 87637 SARSCOV2&INF A&B&RSV AMP PRB: CPT

## 2024-01-30 PROCEDURE — 84484 ASSAY OF TROPONIN QUANT: CPT

## 2024-01-30 PROCEDURE — 74177 CT ABD & PELVIS W/CONTRAST: CPT

## 2024-01-30 PROCEDURE — 83880 ASSAY OF NATRIURETIC PEPTIDE: CPT

## 2024-01-30 PROCEDURE — 36415 COLL VENOUS BLD VENIPUNCTURE: CPT | Performed by: EMERGENCY MEDICINE

## 2024-01-30 PROCEDURE — 25810000003 SODIUM CHLORIDE 0.9 % SOLUTION: Performed by: EMERGENCY MEDICINE

## 2024-01-30 PROCEDURE — 25510000001 IOPAMIDOL PER 1 ML: Performed by: EMERGENCY MEDICINE

## 2024-01-30 PROCEDURE — 80053 COMPREHEN METABOLIC PANEL: CPT

## 2024-01-30 PROCEDURE — 71045 X-RAY EXAM CHEST 1 VIEW: CPT

## 2024-01-30 PROCEDURE — 82948 REAGENT STRIP/BLOOD GLUCOSE: CPT

## 2024-01-30 PROCEDURE — 93005 ELECTROCARDIOGRAM TRACING: CPT | Performed by: EMERGENCY MEDICINE

## 2024-01-30 PROCEDURE — 93010 ELECTROCARDIOGRAM REPORT: CPT | Performed by: SPECIALIST

## 2024-01-30 PROCEDURE — 85025 COMPLETE CBC W/AUTO DIFF WBC: CPT | Performed by: EMERGENCY MEDICINE

## 2024-01-30 RX ORDER — SODIUM CHLORIDE 0.9 % (FLUSH) 0.9 %
10 SYRINGE (ML) INJECTION AS NEEDED
Status: DISCONTINUED | OUTPATIENT
Start: 2024-01-30 | End: 2024-02-03 | Stop reason: HOSPADM

## 2024-01-30 RX ORDER — ONDANSETRON 2 MG/ML
4 INJECTION INTRAMUSCULAR; INTRAVENOUS ONCE
Status: COMPLETED | OUTPATIENT
Start: 2024-01-30 | End: 2024-01-30

## 2024-01-30 RX ADMIN — ONDANSETRON HYDROCHLORIDE 4 MG: 2 SOLUTION INTRAMUSCULAR; INTRAVENOUS at 23:15

## 2024-01-30 RX ADMIN — SODIUM CHLORIDE 1000 ML: 9 INJECTION, SOLUTION INTRAVENOUS at 23:14

## 2024-01-30 RX ADMIN — IOPAMIDOL 93 ML: 755 INJECTION, SOLUTION INTRAVENOUS at 23:41

## 2024-01-31 ENCOUNTER — APPOINTMENT (OUTPATIENT)
Dept: GENERAL RADIOLOGY | Facility: HOSPITAL | Age: 84
DRG: 380 | End: 2024-01-31
Payer: MEDICARE

## 2024-01-31 PROBLEM — K59.00 CONSTIPATION: Status: ACTIVE | Noted: 2024-01-31

## 2024-01-31 PROBLEM — K31.1 GASTRIC OUTLET OBSTRUCTION: Status: ACTIVE | Noted: 2024-01-31

## 2024-01-31 PROBLEM — K44.9 HIATAL HERNIA: Status: ACTIVE | Noted: 2024-01-31

## 2024-01-31 PROBLEM — Z86.59 HISTORY OF DEMENTIA: Status: ACTIVE | Noted: 2024-01-31

## 2024-01-31 PROBLEM — R10.84 GENERALIZED ABDOMINAL PAIN: Status: ACTIVE | Noted: 2024-01-31

## 2024-01-31 LAB
B PARAPERT DNA SPEC QL NAA+PROBE: NOT DETECTED
B PERT DNA SPEC QL NAA+PROBE: NOT DETECTED
C PNEUM DNA NPH QL NAA+NON-PROBE: NOT DETECTED
D-LACTATE SERPL-SCNC: 1 MMOL/L (ref 0.5–2)
FLUAV SUBTYP SPEC NAA+PROBE: NOT DETECTED
FLUBV RNA ISLT QL NAA+PROBE: NOT DETECTED
HADV DNA SPEC NAA+PROBE: NOT DETECTED
HCOV 229E RNA SPEC QL NAA+PROBE: NOT DETECTED
HCOV HKU1 RNA SPEC QL NAA+PROBE: NOT DETECTED
HCOV NL63 RNA SPEC QL NAA+PROBE: NOT DETECTED
HCOV OC43 RNA SPEC QL NAA+PROBE: NOT DETECTED
HMPV RNA NPH QL NAA+NON-PROBE: NOT DETECTED
HPIV1 RNA ISLT QL NAA+PROBE: NOT DETECTED
HPIV2 RNA SPEC QL NAA+PROBE: NOT DETECTED
HPIV3 RNA NPH QL NAA+PROBE: NOT DETECTED
HPIV4 P GENE NPH QL NAA+PROBE: NOT DETECTED
M PNEUMO IGG SER IA-ACNC: NOT DETECTED
QT INTERVAL: 457 MS
QTC INTERVAL: 458 MS
RHINOVIRUS RNA SPEC NAA+PROBE: NOT DETECTED
RSV RNA NPH QL NAA+NON-PROBE: NOT DETECTED
SARS-COV-2 RNA RESP QL NAA+PROBE: NOT DETECTED

## 2024-01-31 PROCEDURE — 25510000001 IOPAMIDOL PER 1 ML: Performed by: STUDENT IN AN ORGANIZED HEALTH CARE EDUCATION/TRAINING PROGRAM

## 2024-01-31 PROCEDURE — 0202U NFCT DS 22 TRGT SARS-COV-2: CPT | Performed by: STUDENT IN AN ORGANIZED HEALTH CARE EDUCATION/TRAINING PROGRAM

## 2024-01-31 PROCEDURE — 83605 ASSAY OF LACTIC ACID: CPT | Performed by: FAMILY MEDICINE

## 2024-01-31 PROCEDURE — 99223 1ST HOSP IP/OBS HIGH 75: CPT | Performed by: FAMILY MEDICINE

## 2024-01-31 PROCEDURE — G0378 HOSPITAL OBSERVATION PER HR: HCPCS

## 2024-01-31 PROCEDURE — 74240 X-RAY XM UPR GI TRC 1CNTRST: CPT

## 2024-01-31 PROCEDURE — 25810000003 LACTATED RINGERS PER 1000 ML: Performed by: FAMILY MEDICINE

## 2024-01-31 PROCEDURE — 25010000002 ENOXAPARIN PER 10 MG: Performed by: FAMILY MEDICINE

## 2024-01-31 PROCEDURE — 25010000002 LEVETIRACETAM IN NACL 0.82% 500 MG/100ML SOLUTION: Performed by: STUDENT IN AN ORGANIZED HEALTH CARE EDUCATION/TRAINING PROGRAM

## 2024-01-31 PROCEDURE — 99221 1ST HOSP IP/OBS SF/LOW 40: CPT | Performed by: SURGERY

## 2024-01-31 RX ORDER — POLYETHYLENE GLYCOL 3350 17 G/17G
17 POWDER, FOR SOLUTION ORAL DAILY PRN
Status: DISCONTINUED | OUTPATIENT
Start: 2024-01-31 | End: 2024-02-03 | Stop reason: HOSPADM

## 2024-01-31 RX ORDER — DIVALPROEX SODIUM 250 MG/1
1 TABLET, DELAYED RELEASE ORAL EVERY 12 HOURS SCHEDULED
COMMUNITY
Start: 2024-01-22

## 2024-01-31 RX ORDER — SODIUM CHLORIDE 9 MG/ML
40 INJECTION, SOLUTION INTRAVENOUS AS NEEDED
Status: DISCONTINUED | OUTPATIENT
Start: 2024-01-31 | End: 2024-02-03 | Stop reason: HOSPADM

## 2024-01-31 RX ORDER — SODIUM CHLORIDE 0.9 % (FLUSH) 0.9 %
10 SYRINGE (ML) INJECTION EVERY 12 HOURS SCHEDULED
Status: DISCONTINUED | OUTPATIENT
Start: 2024-01-31 | End: 2024-02-03 | Stop reason: HOSPADM

## 2024-01-31 RX ORDER — VILAZODONE HYDROCHLORIDE 20 MG/1
20 TABLET ORAL DAILY
Status: DISCONTINUED | OUTPATIENT
Start: 2024-01-31 | End: 2024-02-03 | Stop reason: HOSPADM

## 2024-01-31 RX ORDER — BISACODYL 10 MG
10 SUPPOSITORY, RECTAL RECTAL DAILY PRN
Status: DISCONTINUED | OUTPATIENT
Start: 2024-01-31 | End: 2024-02-03 | Stop reason: HOSPADM

## 2024-01-31 RX ORDER — HYDRALAZINE HYDROCHLORIDE 20 MG/ML
10 INJECTION INTRAMUSCULAR; INTRAVENOUS EVERY 6 HOURS PRN
Status: DISCONTINUED | OUTPATIENT
Start: 2024-01-31 | End: 2024-02-03

## 2024-01-31 RX ORDER — MEMANTINE HYDROCHLORIDE 10 MG/1
1 TABLET ORAL 2 TIMES DAILY
COMMUNITY
Start: 2024-01-17

## 2024-01-31 RX ORDER — QUETIAPINE FUMARATE 25 MG/1
50 TABLET, FILM COATED ORAL NIGHTLY
Status: DISCONTINUED | OUTPATIENT
Start: 2024-01-31 | End: 2024-02-03 | Stop reason: HOSPADM

## 2024-01-31 RX ORDER — ENOXAPARIN SODIUM 100 MG/ML
40 INJECTION SUBCUTANEOUS DAILY
Status: DISCONTINUED | OUTPATIENT
Start: 2024-01-31 | End: 2024-02-03 | Stop reason: HOSPADM

## 2024-01-31 RX ORDER — DM/PE/ACETAMINOPHEN/CHLORPHENR 10-5-325-2
1 TABLET, SEQUENTIAL ORAL DAILY
COMMUNITY
Start: 2024-01-17

## 2024-01-31 RX ORDER — LEVETIRACETAM 500 MG/1
500 TABLET ORAL 2 TIMES DAILY
Status: DISCONTINUED | OUTPATIENT
Start: 2024-01-31 | End: 2024-01-31

## 2024-01-31 RX ORDER — RISPERIDONE 0.5 MG/1
0.5 TABLET, ORALLY DISINTEGRATING ORAL DAILY
COMMUNITY
Start: 2024-01-17

## 2024-01-31 RX ORDER — SODIUM CHLORIDE, SODIUM LACTATE, POTASSIUM CHLORIDE, CALCIUM CHLORIDE 600; 310; 30; 20 MG/100ML; MG/100ML; MG/100ML; MG/100ML
75 INJECTION, SOLUTION INTRAVENOUS CONTINUOUS
Status: ACTIVE | OUTPATIENT
Start: 2024-01-31 | End: 2024-02-01

## 2024-01-31 RX ORDER — PANTOPRAZOLE SODIUM 40 MG/10ML
40 INJECTION, POWDER, LYOPHILIZED, FOR SOLUTION INTRAVENOUS
Status: DISCONTINUED | OUTPATIENT
Start: 2024-01-31 | End: 2024-02-03 | Stop reason: HOSPADM

## 2024-01-31 RX ORDER — ONDANSETRON 2 MG/ML
4 INJECTION INTRAMUSCULAR; INTRAVENOUS EVERY 6 HOURS PRN
Status: DISCONTINUED | OUTPATIENT
Start: 2024-01-31 | End: 2024-02-03 | Stop reason: HOSPADM

## 2024-01-31 RX ORDER — MONTELUKAST SODIUM 10 MG/1
10 TABLET ORAL NIGHTLY
Status: DISCONTINUED | OUTPATIENT
Start: 2024-01-31 | End: 2024-02-03 | Stop reason: HOSPADM

## 2024-01-31 RX ORDER — DONEPEZIL HYDROCHLORIDE 10 MG/1
10 TABLET, FILM COATED ORAL NIGHTLY
Status: DISCONTINUED | OUTPATIENT
Start: 2024-01-31 | End: 2024-02-03 | Stop reason: HOSPADM

## 2024-01-31 RX ORDER — AMOXICILLIN 250 MG
2 CAPSULE ORAL 2 TIMES DAILY
Status: DISCONTINUED | OUTPATIENT
Start: 2024-01-31 | End: 2024-02-03 | Stop reason: HOSPADM

## 2024-01-31 RX ORDER — LEVETIRACETAM 5 MG/ML
500 INJECTION INTRAVASCULAR EVERY 12 HOURS SCHEDULED
Status: DISCONTINUED | OUTPATIENT
Start: 2024-01-31 | End: 2024-02-03 | Stop reason: HOSPADM

## 2024-01-31 RX ORDER — BISACODYL 5 MG/1
5 TABLET, DELAYED RELEASE ORAL DAILY PRN
Status: DISCONTINUED | OUTPATIENT
Start: 2024-01-31 | End: 2024-02-03 | Stop reason: HOSPADM

## 2024-01-31 RX ORDER — SODIUM CHLORIDE 0.9 % (FLUSH) 0.9 %
10 SYRINGE (ML) INJECTION AS NEEDED
Status: DISCONTINUED | OUTPATIENT
Start: 2024-01-31 | End: 2024-02-03 | Stop reason: HOSPADM

## 2024-01-31 RX ORDER — MIRTAZAPINE 7.5 MG/1
7.5 TABLET, FILM COATED ORAL
COMMUNITY
Start: 2024-01-17

## 2024-01-31 RX ADMIN — SODIUM CHLORIDE, POTASSIUM CHLORIDE, SODIUM LACTATE AND CALCIUM CHLORIDE 75 ML/HR: 600; 310; 30; 20 INJECTION, SOLUTION INTRAVENOUS at 06:26

## 2024-01-31 RX ADMIN — PANTOPRAZOLE SODIUM 40 MG: 40 INJECTION, POWDER, FOR SOLUTION INTRAVENOUS at 06:28

## 2024-01-31 RX ADMIN — ENOXAPARIN SODIUM 40 MG: 100 INJECTION SUBCUTANEOUS at 08:03

## 2024-01-31 RX ADMIN — IOPAMIDOL 40 ML: 510 INJECTION, SOLUTION INTRAVASCULAR at 12:22

## 2024-01-31 RX ADMIN — LEVETIRACETAM 500 MG: 5 INJECTION, SOLUTION INTRAVENOUS at 20:47

## 2024-01-31 NOTE — CONSULTS
[facility]   HISTORY AND PHYSICAL    Patient Name: Sonja Gunn  : 1940  MRN: [id number]  Primary Care Physician:  Mike Cordoba MD  Date of admission: 2024    Subjective   Subjective     Chief Complaint: Abdominal pain, nausea vomiting    HPI:    Sonja Gunn is a 83 y.o. female with advanced dementia who presented to the emergency department with complaints of abdominal pain and nausea vomiting.  CT showed concerns for potential gastric volvulus involved with her hiatal hernia.  At time of evaluation this morning she is resting well.  No nausea or vomiting.  History is difficult to obtain secondary to her mental status.    Review of Systems   Unable to perform ROS: Dementia        Personal History     Past Medical History:   Diagnosis Date    Alzheimer disease        History reviewed. No pertinent surgical history.    Family History: Family history is unknown by patient. Otherwise pertinent FHx was reviewed and not pertinent to current issue.    Social History:  reports that she has never smoked. She has never used smokeless tobacco. She reports that she does not drink alcohol and does not use drugs.    Home Medications:  Cyanocobalamin ER, divalproex, donepezil, memantine, mirtazapine, and risperiDONE      Allergies:  No Known Allergies    Objective   Objective     Vitals:   Temp:  [97.7 °F (36.5 °C)-98.6 °F (37 °C)] 97.7 °F (36.5 °C)  Heart Rate:  [59-69] 67  Resp:  [18-20] 18  BP: (100-131)/(67-87) 103/71  Flow (L/min):  [2] 2    Physical Exam  Constitutional:       Appearance: Normal appearance.   Cardiovascular:      Rate and Rhythm: Normal rate.   Pulmonary:      Effort: Pulmonary effort is normal.   Abdominal:      Palpations: Abdomen is soft.          Result Review    Result Review:  I have personally reviewed the results from the time of this admission to 2024 12:17 EST and agree with these findings:  [x]  Laboratory  []  Microbiology  []  Radiology  []  EKG/Telemetry   []   Cardiology/Vascular   []  Pathology  []  Old records  []  Other:    Lab Results   Component Value Date    WBC 8.77 01/30/2024    HGB 13.9 01/30/2024    HCT 43.7 01/30/2024    MCV 94.6 01/30/2024     01/30/2024      Lab Results   Component Value Date    GLUCOSE 139 (H) 01/30/2024    CALCIUM 9.3 01/30/2024     01/30/2024    K 4.0 01/30/2024    CO2 27.9 01/30/2024     01/30/2024    BUN 14 01/30/2024    CREATININE 0.90 01/30/2024    EGFR 64.4 01/30/2024    BCR 15.6 01/30/2024    ANIONGAP 11.1 01/30/2024        Lab Results   Component Value Date    ALT 12 01/30/2024      Most notable findings include: White count normalized, CT with concerning findings as noted above    Assessment & Plan   Assessment / Plan     Brief Patient Summary:  Sonja Gunn is a 83 y.o. female who has hiatal hernia with possible gastric volvulus or obstruction    Active Hospital Problems:  Active Hospital Problems    Diagnosis     **Generalized abdominal pain     Gastric outlet obstruction     Hiatal hernia     Constipation     History of dementia      Plan:  Discussed with the patient family at bedside extensively  They want to avoid surgery or any extensive interventions  I think this is reasonable  However, if she does have a gastric volvulus it is possible that I can try and put in a PEG tube to detorsed the volvulus and make it so that she could eat but I would not do an extensive hernia repair  I discussed with radiology and orders are placed to try and upper GI to see if we can rule out a gastric obstruction  If she is unable to cooperate or perform the upper GI then we will have to have a discussion with family about next steps and/or comfort measures      DVT prophylaxis:  Medical DVT prophylaxis orders are present.        CODE STATUS:       Admission Status:  I believe this patient meets inpatient status.    Electronically signed by Víctor Shultz MD, 01/31/24, 12:17 PM EST.

## 2024-01-31 NOTE — PROGRESS NOTES
Patient is a 83-year-old female with past medical history of advanced dementia, hypertension and GERD who presented with abdominal pain, nausea and vomiting from her assisted living facility.  Patient is nonverbal.  History was obtained from physician handoff and chart review by the admitting physician.  Patient started having abdominal discomfort 1 day prior to presentation followed by vomiting.  They were concerned about possible COVID so she was brought to the ED for further evaluation.  Patient was hypoxic in the ED requiring 2 L/min of oxygen via NC to maintain saturation.  Lab work was nonsignificant.  Chest x-ray showed no significant interval changes compared to prior study which showed large diaphragmatic hernia with associated atelectasis at the lung bases.  CT abdomen pelvis with contrast was obtained which showed a very large hiatal hernia with dilated stomach and small bowel and colon; associated with gastric outlet obstruction; mesentery volvulus with strangulation of small bowel within the hiatal hernia cannot be excluded; very large stool burden; dilated fluid-filled esophagus at the level of upper thorax.  Surgery on-call was contacted by the ED physician who recommended NG tube and GI consult.  Patient was then admitted for further evaluation and management.    Interval events: No acute events since admission.  Patient is lying in bed, not in acute distress during my evaluation.  Nonverbal but said yes when mentioned about abdominal pain.  Did not communicate anything else apart from that.  No tenderness on palpation of her belly but was distended.  Rest of the examination was benign.  On 2 L/min of oxygen via NC, saturating 97%.    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  -Surgery following the patient, appreciate further input.  -Plan for radiology upper GI fluoroscopy if possible to rule out gastric obstruction.  -Family does not want surgery or any extensive interventions.  -GI was consulted by admitting physician.  Appreciate input.  -Continue Keppra 500 mg twice daily.  -Continue rest of the current management.  -Discussion regarding CODE STATUS was done with the son, her CODE STATUS to be DNR/DNI.  -Might involve palliative team if patient unable to cooperate for upper GI or upper GI could not be performed.

## 2024-01-31 NOTE — PLAN OF CARE
Problem: Skin Injury Risk Increased  Goal: Skin Health and Integrity  Outcome: Ongoing, Progressing  Intervention: Optimize Skin Protection  Recent Flowsheet Documentation  Taken 1/31/2024 1600 by Comfort Zuluaga RN  Head of Bed (HOB) Positioning: HOB elevated  Taken 1/31/2024 1400 by Comfort Zuluaga RN  Head of Bed (HOB) Positioning: HOB elevated  Taken 1/31/2024 1200 by Comfort Zuluaga RN  Head of Bed (HOB) Positioning: HOB elevated  Taken 1/31/2024 1000 by Comfort Zuluaga RN  Head of Bed (HOB) Positioning: HOB elevated  Taken 1/31/2024 0715 by Comfort Zuluaga RN  Head of Bed (HOB) Positioning: HOB elevated   Goal Outcome Evaluation:   Patient disoriented x4. Patient on 2L NC. Patient has had no complaints.

## 2024-01-31 NOTE — H&P
[facility]   HISTORY AND PHYSICAL    Patient Name: Sonja Gunn  : 1940  MRN: [id number]  Primary Care Physician:  Mike Cordoba MD  Date of admission: 2024    Subjective   Subjective     Chief Complaint: Nausea and vomiting    HPI:    Sonja Gunn is a 83 y.o. female with past medical history of advanced dementia, hypertension, and GERD was brought in from [facility] with abdominal pain and nausea/vomiting.  When seen patient was nonverbal so history was taken via chart review and physician handoff.  While at nursing facility patient began to have abdominal discomfort yesterday that was later accompanied with vomiting.  There was some concern for possible COVID so she was brought to the ED for further evaluation.  While in the ED patient was hypoxic on arrival requiring oxygen subincision via nasal cannula with remaining vitals being within normal limits.  Labs were all unremarkable given her chronic conditions including a negative COVID flu RSV, and normal white count troponin and BNP.  Chest x-ray was negative for any acute findings but abdominal CT did show a very large hiatal hernia which contains dilated stomach and small bowel and colon along with other structures with possible associated gastric outlet obstruction.  Very large stool burden also seen with maximum AP diameter of 11.3 cm.  Patient was admitted for further evaluation and treatment    Review of Systems  Patient nonverbal    Personal History     Past Medical History:   Diagnosis Date    Alzheimer disease        History reviewed. No pertinent surgical history.    Family History: Family history is unknown by patient. Otherwise pertinent FHx was reviewed and not pertinent to current issue.    Social History:  reports that she has never smoked. She has never used smokeless tobacco. She reports that she does not drink alcohol and does not use drugs.    Home Medications:  QUEtiapine, donepezil, levETIRAcetam, montelukast, and  vilazodone      Allergies:  No Known Allergies    Objective   Objective     Vitals:   Temp:  [98.3 °F (36.8 °C)-98.6 °F (37 °C)] 98.6 °F (37 °C)  Heart Rate:  [60-69] 60  Resp:  [20] 20  BP: (117-131)/(67-87) 119/75  Flow (L/min):  [2] 2  Physical Exam    Constitutional: Nonverbal   Eyes: PERRLA, sclerae anicteric, no conjunctival injection   HENT: NCAT, mucous membranes moist   Neck: Supple, no thyromegaly, no lymphadenopathy, trachea midline   Respiratory: Clear to auscultation bilaterally, nonlabored respirations    Cardiovascular: RRR, no murmurs, rubs, or gallops, palpable pedal pulses bilaterally   Gastrointestinal: Hard, hypoactive bowel sounds   Musculoskeletal: No bilateral ankle edema, no clubbing or cyanosis to extremities   Psychiatric: Unable to evaluate   Neurologic: Pupils reactive   Skin: No rashes     Result Review    Result Review:  I have personally reviewed the results from the time of this admission to 1/31/2024 06:05 EST and agree with these findings:  [x]  Laboratory list / accordion  []  Microbiology  [x]  Radiology  [x]  EKG/Telemetry   []  Cardiology/Vascular   []  Pathology  []  Old records  []  Other:  Most notable findings include: Labs unremarkable, CT abdomen with large hiatal hernia with stomach bowel and colon, large stool burden      Assessment & Plan   Assessment / Plan     Brief Patient Summary:  Sonja Gunn is a 83 y.o. female with past medical history of advanced dementia, hypertension, and GERD was brought in from [facility] with abdominal pain and nausea/vomiting.    Active Hospital Problems:  Active Hospital Problems    Diagnosis     **Generalized abdominal pain     Gastric outlet obstruction     Hiatal hernia     Constipation     History of dementia      Plan:     Abdominal pain  -Admit to medical floor  -Patient with some nausea and vomiting as well  -Significant hiatal hernia along with stool burden/impaction  -CT abdomen reviewed  -Antiemetics as needed  -N.p.o.  -IVF  -GI  consulted  -Family requesting minimal intervention given her advanced dementia  -Supportive care    HTN  -Currently well controlled  -PRN BP meds  -Resume home meds when available  -Titrate if needed    Hx Advanced Dementia      GI ppx  DVT ppx      DVT prophylaxis:  Medical DVT prophylaxis orders are present.        CODE STATUS: Full code     Admission Status:  I believe this patient meets inpatient status.      Electronically signed by Hipolito Wiley MD, 01/31/24, 6:05 AM EST.

## 2024-01-31 NOTE — ED PROVIDER NOTES
Time: 9:03 PM EST  Date of encounter:  1/30/2024  Independent Historian/Clinical History and Information was obtained by:   Family    History is limited by: Dementia    Chief Complaint   Patient presents with    Nausea         History of Present Illness:  Patient is a 83 y.o. year old female who presents to the emergency department for evaluation of nausea, vomiting.  Patient is staying at an assisted living facility when they called the patient's family.  Patient has been having nausea, vomiting that started today.  They wanted her to get COVID and flu tested.  She was supposed to get nausea medication but the pharmacy at her facility has closed.  She has not been able to eat anything today.  Patient does have advanced dementia. (Triage Provider: Jack Mcknight PA-C).      Patient Care Team  Primary Care Provider: Mike Cordoba MD    Past Medical History:     No Known Allergies  Past Medical History:   Diagnosis Date    Alzheimer disease      History reviewed. No pertinent surgical history.  Family History   Family history unknown: Yes       Home Medications:  Prior to Admission medications    Medication Sig Start Date End Date Taking? Authorizing Provider   donepezil (ARICEPT) 10 MG tablet Take 10 mg by mouth Every Night.    ProviderAnant MD   levETIRAcetam (KEPPRA) 500 MG tablet Take 1 tablet by mouth 2 (Two) Times a Day. 1/11/24   Jose Antonio Talamantes MD   montelukast (SINGULAIR) 10 MG tablet Take 10 mg by mouth Every Night.    ProviderAnant MD   QUEtiapine (SEROquel) 50 MG tablet Take 50 mg by mouth Every Night.    Anant Rivers MD   vilazodone (VIIBRYD) 20 MG tablet tablet Take 20 mg by mouth Daily.    ProviderAnant MD        Social History:   Social History     Tobacco Use    Smoking status: Never    Smokeless tobacco: Never   Vaping Use    Vaping Use: Never used   Substance Use Topics    Alcohol use: Never    Drug use: Never         Review of Systems:  Review of Systems   Unable to  "perform ROS: Dementia   Gastrointestinal:  Positive for nausea and vomiting.        Physical Exam:  /83   Pulse 69   Temp 98.3 °F (36.8 °C) (Oral)   Resp 20   Ht 162.6 cm (64\")   Wt 61.8 kg (136 lb 3.9 oz)   SpO2 93%   BMI 23.39 kg/m²         Physical Exam  Constitutional:       Appearance: Normal appearance.   HENT:      Head: Normocephalic and atraumatic.      Nose: Nose normal.      Mouth/Throat:      Mouth: Mucous membranes are moist.   Eyes:      Extraocular Movements: Extraocular movements intact.      Conjunctiva/sclera: Conjunctivae normal.      Pupils: Pupils are equal, round, and reactive to light.   Cardiovascular:      Rate and Rhythm: Normal rate and regular rhythm.      Pulses: Normal pulses.      Heart sounds: Normal heart sounds.   Pulmonary:      Effort: Pulmonary effort is normal.      Breath sounds: Normal breath sounds.   Abdominal:      General: There is no distension.      Palpations: Abdomen is soft.      Tenderness: There is abdominal tenderness.   Musculoskeletal:         General: Normal range of motion.      Cervical back: Normal range of motion and neck supple.   Skin:     General: Skin is warm and dry.      Capillary Refill: Capillary refill takes less than 2 seconds.   Neurological:      General: No focal deficit present.      Mental Status: She is alert and oriented to person, place, and time. Mental status is at baseline.   Psychiatric:         Mood and Affect: Mood normal.         Behavior: Behavior normal.                      Procedures:  Procedures      Medical Decision Making:      Comorbidities that affect care:    Dementia    External Notes reviewed:    Previous Clinic Note: Patient seen by podiatry on 7/14/2021 for left foot fracture.      The following orders were placed and all results were independently analyzed by me:  Orders Placed This Encounter   Procedures    COVID-19, FLU A/B, RSV PCR 1 HR TAT - Swab, Nasopharynx    XR Chest 1 View    CT Abdomen Pelvis " With Contrast    [place] Draw    Comprehensive Metabolic Panel    BNP    Single High Sensitivity Troponin T    CBC Auto Differential    Urinalysis With Culture If Indicated - Urine, Clean Catch    NPO Diet NPO Type: Strict NPO    Undress & Gown    Continuous Pulse Oximetry    Vital Signs    Discontinue Cardiac Monitoring    IP Consult to General Surgery    Inpatient Hospitalist Consult    Oxygen Therapy- Nasal Cannula; Titrate 1-6 LPM Per SpO2; 90 - 95%    POC Glucose Once    ECG 12 Lead ED Triage Standing Order; SOA    Insert Peripheral IV    Initiate Observation Status    CBC & Differential    Green Top (Gel)    Lavender Top    Gold Top - SST    Light Blue Top       Medications Given in the Emergency Department:  Medications   sodium chloride 0.9 % flush 10 mL (has no administration in time range)   sodium chloride 0.9 % bolus 1,000 mL (1,000 mL Intravenous New Bag 1/30/24 2314)   ondansetron (ZOFRAN) injection 4 mg (4 mg Intravenous Given 1/30/24 2315)   iopamidol (ISOVUE-370) 76 % injection 100 mL (93 mL Intravenous Given 1/30/24 2341)        ED Course:    The patient was initially evaluated in the triage area where orders were placed. The patient was later dispositioned by Khalida Gould MD.      The patient was advised to stay for completion of workup which includes but is not limited to communication of labs and radiological results, reassessment and plan. The patient was advised that leaving prior to disposition by a provider could result in critical findings that are not communicated to the patient.     ED Course as of 01/31/24 0159   Tue Jan 30, 2024   2105 PROVIDER IN TRIAGE  Patient was evaluated by me, Jack Mcknight PA-C. Orders were placed and awaiting final results and disposition.   [MV]   Wed Jan 31, 2024   0159 ECG 12 Lead ED Triage Standing Order; SOA  Sinus rhythm rate of 60.  No acute ST elevation.  Normal NV and QTc.  EKG interpreted by me   [LD]      ED Course User Index  [LD] Luis Fernando  MD Khalida  [MV] Jack Mcknight PA       Labs:    Lab Results (last 24 hours)       Procedure Component Value Units Date/Time    COVID-19, FLU A/B, RSV PCR 1 HR TAT - Swab, Nasopharynx [[id number]]  (Normal) Collected: 01/30/24 2107    Specimen: Swab from Nasopharynx Updated: 01/30/24 2203     COVID19 Not Detected     Influenza A PCR Not Detected     Influenza B PCR Not Detected     RSV, PCR Not Detected    Narrative:      Fact sheet for providers: https://www.fda.gov/media/672012/download    Fact sheet for patients: https://www.fda.gov/media/040807/download    Test performed by PCR.    CBC & Differential [[id number]]  (Abnormal) Collected: 01/30/24 2111    Specimen: Blood from Arm, Right Updated: 01/30/24 2130    Narrative:      The following orders were created for panel order CBC & Differential.  Procedure                               Abnormality         Status                     ---------                               -----------         ------                     CBC Auto Differential[[id number]]        Abnormal            Final result                 Please view results for these tests on the individual orders.    Comprehensive Metabolic Panel [[id number]]  (Abnormal) Collected: 01/30/24 2111    Specimen: Blood from Arm, Right Updated: 01/30/24 2149     Glucose 139 mg/dL      BUN 14 mg/dL      Creatinine 0.90 mg/dL      Sodium 141 mmol/L      Potassium 4.0 mmol/L      Chloride 102 mmol/L      CO2 27.9 mmol/L      Calcium 9.3 mg/dL      Total Protein 7.5 g/dL      Albumin 4.1 g/dL      ALT (SGPT) 12 U/L      AST (SGOT) 14 U/L      Alkaline Phosphatase 77 U/L      Total Bilirubin 0.4 mg/dL      Globulin 3.4 gm/dL      A/G Ratio 1.2 g/dL      BUN/Creatinine Ratio 15.6     Anion Gap 11.1 mmol/L      eGFR 64.4 mL/min/1.73     Narrative:      GFR Normal >60  Chronic Kidney Disease <60  Kidney Failure <15    The GFR formula is only valid for adults with stable renal function between ages 18 and 70.    BNP [[id number]]   (Normal) Collected: 01/30/24 2111    Specimen: Blood from Arm, Right Updated: 01/30/24 2144     proBNP 195.8 pg/mL     Narrative:      This assay is used as an aid in the diagnosis of individuals suspected of having heart failure. It can be used as an aid in the diagnosis of acute decompensated heart failure (ADHF) in patients presenting with signs and symptoms of ADHF to the emergency department (ED). In addition, NT-proBNP of <300 pg/mL indicates ADHF is not likely.    Age Range Result Interpretation  NT-proBNP Concentration (pg/mL:      <50             Positive            >450                   Gray                 300-450                    Negative             <300    50-75           Positive            >900                  Gray                300-900                  Negative            <300      >75             Positive            >1800                  Gray                300-1800                  Negative            <300    Single High Sensitivity Troponin T [[id number]]  (Normal) Collected: 01/30/24 2111    Specimen: Blood from Arm, Right Updated: 01/30/24 2146     HS Troponin T 12 ng/L     Narrative:      High Sensitive Troponin T Reference Range:  <14.0 ng/L- Negative Female for AMI  <22.0 ng/L- Negative Male for AMI  >=14 - Abnormal Female indicating possible myocardial injury.  >=22 - Abnormal Male indicating possible myocardial injury.   Clinicians would have to utilize clinical acumen, EKG, Troponin, and serial changes to determine if it is an Acute Myocardial Infarction or myocardial injury due to an underlying chronic condition.         CBC Auto Differential [[id number]]  (Abnormal) Collected: 01/30/24 2111    Specimen: Blood from Arm, Right Updated: 01/30/24 2130     WBC 8.77 10*3/mm3      RBC 4.62 10*6/mm3      Hemoglobin 13.9 g/dL      Hematocrit 43.7 %      MCV 94.6 fL      MCH 30.1 pg      MCHC 31.8 g/dL      RDW 14.6 %      RDW-SD 50.7 fl      MPV 11.1 fL      Platelets 220 10*3/mm3       Neutrophil % 88.9 %      Lymphocyte % 7.8 %      Monocyte % 2.9 %      Eosinophil % 0.0 %      Basophil % 0.1 %      Immature Grans % 0.3 %      Neutrophils, Absolute 7.80 10*3/mm3      Lymphocytes, Absolute 0.68 10*3/mm3      Monocytes, Absolute 0.25 10*3/mm3      Eosinophils, Absolute 0.00 10*3/mm3      Basophils, Absolute 0.01 10*3/mm3      Immature Grans, Absolute 0.03 10*3/mm3      nRBC 0.0 /100 WBC     POC Glucose Once [[id number]]  (Abnormal) Collected: 01/30/24 2353    Specimen: Blood Updated: 01/30/24 2355     Glucose 113 mg/dL      Comment: Serial Number: [id number]:  [id number]                Imaging:    CT Abdomen Pelvis With Contrast    Result Date: 1/31/2024  PROCEDURE: CT ABDOMEN PELVIS W CONTRAST  COMPARISON: 11/2/2020.  INDICATIONS: 83-year-old female w/ h/o abdominal pain, not otherwise specified (NOS).  TECHNIQUE: After obtaining the patient's consent, 925 CT images were created with non-ionic intravenous contrast material. Despite best efforts, poor image quality limits interpretation of the study, especially due to patient motion artifact.   , obscuring detail. Despite best efforts, poor image quality limits interpretation of the study, especially due to patient motion artifact.   PROTOCOL:   Standard CT imaging protocol performed.    RADIATION:   Total DLP: 925.6 mGy*cm.   Automated exposure control was utilized to minimize radiation dose. CONTRAST: 93 mL Isovue 370 I.V.  FINDINGS: Again identified is a very large hiatal hernia (estimated at 24.3 [TR] x 13.3 [CC] x 12.5 [AP] cm), which contains stomach, colon, small bowel (including proximal duodenum), and nearly the entire pancreas.  There is new or greater distension of the stomach identified within the hiatal hernia when compared with the prior studies from 11/2/2020.  Mesenteric and retroperitoneal fat (as well as vessels) is also seen within the large hiatal hernia.  There is a dilated upper thoracic esophagus, which measures about 4  cm in greatest diameter (as on image 1 of series 201).  A fluid-distended stomach is also seen.  A gastric outlet obstruction cannot be excluded, possibly related to the large hiatal hernia.  An organoaxial gastric volvulus is possible, as well.  Mesenteric volvulus and/or strangulation of small bowel herniated into the large hiatal hernia cannot be excluded.  No gastric emphysema is seen.  No pneumatosis or portal or mesenteric venous gas is seen to suggest ischemic enterocolitis or gastritis.  No pneumoperitoneum.  No definite extraluminal mediastinal, peritoneal, or retroperitoneal fluid collections are seen to suggest abscess or visceral organ perforation or fistula formation.  The gallbladder is distended with gallstones.  The cystic duct is extrinsically deformed at the level of the diaphragmatic hiatus as it passes superiorly to join the common hepatic duct; it and the common bile duct are thought to be predominantly in the hiatal hernia.  No definite acute cholecystitis or acute pancreatitis.  Minimal if any dilatation of the main pancreatic duct is possible.  Pancreatic divisum cannot be excluded.  No hydronephrosis or obstructive uropathy.  A large amount of formed stool is seen within the colon, especially the rectosigmoid colon.  There may be fecal stasis as with constipation.  Fecal impaction is possible.  The maximum AP (anteroposterior) diameter of the rectum is about 11.3 cm, such as seen on image 180 of series 201 and image 117 of series 203.  No perirectal or pericolonic fluid collection is identified to suggest abscess.  The appendix is not clearly identified.  There may be some degree of diffuse hepatic steatosis.  No splenomegaly.  Probably no hepatomegaly.  The patient's upper extremities in the scan field of view obscure detail.  The study was not tailored in order to evaluate the patient's upper extremities.  Other external densities in the scan field of view obscure detail.  Again, there is  motion artifact on the study, obscuring detail.  There is fusiform dilatation of the ascending aorta, which measures approximately 4 cm in greatest diameter.  Consider close interval clinical and imaging follow-up this finding.  No thoracic or abdominal aortic dissection is suggested.  There are coronary artery calcifications.  Atelectasis, fibrosis, and/or infiltrate(s) in the partially imaged lungs (especially in the bilateral lower lobes, probably greater on the left than the right) is/are possible.  There is extrinsic deformity of the mediastinum and the airways related to the large hiatal hernia.  Degenerative changes are seen throughout the imaged spine.  There is mild superior endplate vertebral compression deformity at T12, as before.  Degenerative changes involve the sacroiliac (SI) joints and the hip joints.  There may be thoracolumbar scoliosis.  No gross evidence of acute fracture or aggressive osseous lesion.  No aneurysmal dilatation of the abdominal aorta is seen.  There is mild fusiform dilatation of the right common iliac artery, which measure about 1.2 cm in maximum diameter.  No acute intraperitoneal or retroperitoneal hemorrhage.  No gross evidence of urinary bladder wall thickening or urinary bladder calculi.  No definite suspicious uterine or adnexal mass.        1. A very large hiatal hernia is seen, which contains a dilated stomach and small bowel and colon as well as other structures.  An associated gastric outlet obstruction (such as related to an organoaxial volvulus) cannot be excluded.  Mesenteric volvulus with strangulation of small bowel within the hiatal hernia cannot be excluded; however, the small bowel is nondilated.  No pneumatosis or portal or mesenteric venous gas is seen to suggest ischemic bowel.  No definite extraluminal fluid collections are seen to suggest perforation.  No pneumoperitoneum.  2. A very large stool burden is seen.  There may be fecal stasis (or fecal  retention) as with constipation.  Fecal impaction is possible.  Age-indeterminate stercoral colo-proctitis cannot be excluded.  No definite stercoral ulceration.  No perirectal fluid collection is seen to suggest abscess.  The maximum AP diameter of the rectum is about 11.3 cm.  3. The appendix is not clearly identified.  4. The gallbladder is distended.  There are gallstones without definite acute cholecystitis.  Extrinsic deformity of the cystic duct is seen, as discussed above.  No acute pancreatitis.  5. The study is very limited for several reasons, as detailed above.  6. There is a dilated fluid-filled esophagus (estimated at 4 cm in maximum diameter) at the level of the upper thorax. 7. Please see above comments for further detail.  1.   Please note that portions of this note were completed with a voice recognition program.  LUCIANO ESTES JR, MD       Electronically Signed and Approved By: LUCIANO ESTES JR, MD on 1/31/2024 at 0:49              XR Chest 1 View    Result Date: 1/30/2024  PROCEDURE: XR CHEST 1 VW  COMPARISONS: 1/11/2024; 11/2/2020.  INDICATIONS: 83-year-old female w/ h/o shortness of breath & nausea.  FINDINGS:  A single AP upright portable chest radiograph reveals a very large hiatal hernia, which contains stomach and colon, seen on prior studies.  There is suspected atelectasis and/or infiltrate(s) within the lung bases with overall low lung volumes.  The cardiac silhouette is thought to be extrinsically deformed by the very large hiatal hernia and displaced anteriorly, which probably accounts for its accentuation.  Chronic calcified granulomatous disease involves the chest.  The thoracic aorta is atherosclerotic.  No pneumothorax is seen.  No definite pneumomediastinum.  Minimal bilateral pleural effusion is possible.        No significant interval change is suggested radiographically since prior studies.  Please see above comments for further detail.      Please note that portions of this  note were completed with a voice recognition program.  LUCIANO ESTES JR, MD       Electronically Signed and Approved By: LUCIANO ESTES JR, MD on 1/30/2024 at 21:49                 Differential Diagnosis and Discussion:      Abdominal Pain: Based on the patient's signs and symptoms, I considered abdominal aortic aneurysm, small bowel obstruction, pancreatitis, acute cholecystitis, acute appendecitis, peptic ulcer disease, gastritis, colitis, endocrine disorders, irritable bowel syndrome and other differential diagnosis an etiology of the patient's abdominal pain.    All labs were reviewed and interpreted by me.  All X-rays impressions were independently interpreted by me.  EKG was interpreted by me.  CT scan radiology impression was interpreted by me.    MDM  Number of Diagnoses or Management Options  Constipation, unspecified constipation type  Generalized abdominal pain  Hiatal hernia  Diagnosis management comments: Patient is afebrile nontoxic-appearing.  Vital signs are stable.  At time of evaluation she is lying in bed in no acute distress.  No vomiting while in the emergency department.  Labs show no significant abnormality.  With report abdominal pain CT was obtained that showed a large hiatal hernia with dilated stomach, small bowel and colon associate gastric outlet obstruction cannot be excluded, mesenteric volvulus with strangulation of small bowel within her hiatal hernia can also not be excluded.  She also has a large stool burden.  Discussed case with her surgeon on-call states could try an NG tube, oral contrast or GI consult to further evaluate.  I discussed findings with patient's son.  They states at this point they do not feel like they would even want to pursue surgery if it comes to that.  Discussed patient with hospitalist and will admit for further care.       Amount and/or Complexity of Data Reviewed  Clinical lab tests: reviewed  Tests in the radiology section of CPT®: reviewed  Review and  summarize past medical records: yes  Independent visualization of images, tracings, or specimens: yes    Risk of Complications, Morbidity, and/or Mortality  Presenting problems: moderate  Management options: moderate                 Patient Care Considerations:    SEPSIS was considered but is NOT present in the emergency department as SIRS criteria is not present.      Consultants/Shared Management Plan:    Hospitalist: I have discussed the case with Hospitalist who agrees to accept the patient for admission.  Consultant: I have discussed the case with Dr Shultz who agrees to consult on the patient.    Social Determinants of Health:    Patient is a nursing home/assisted living resident and has reliable access to care.      Disposition and Care Coordination:    Admit:   Through independent evaluation of the patient's history, physical, and imperical data, the patient meets criteria for inpatient admission to the hospital.        Final diagnoses:   Generalized abdominal pain   Constipation, unspecified constipation type   Hiatal hernia        ED Disposition       ED Disposition   Decision to Admit    Condition   --    Comment   Level of Care: Med/Surg [1]   Diagnosis: Gastric outlet obstruction [[id number]]   Admitting Physician: WILLIE ENRIQUEZ [[id number]]                 This medical record created using voice recognition software.             Khalida Gould MD  01/31/24 0159

## 2024-01-31 NOTE — PLAN OF CARE
Goal Outcome Evaluation:               Patient arrived to floor @ 345 am,non verbal with no family members at bed side, RN tried to contact family member to complete admission, no answer at contact information, no message were left.

## 2024-02-01 LAB
ANION GAP SERPL CALCULATED.3IONS-SCNC: 7.5 MMOL/L (ref 5–15)
BASOPHILS # BLD AUTO: 0.04 10*3/MM3 (ref 0–0.2)
BASOPHILS NFR BLD AUTO: 0.6 % (ref 0–1.5)
BUN SERPL-MCNC: 14 MG/DL (ref 8–23)
BUN/CREAT SERPL: 16.7 (ref 7–25)
CALCIUM SPEC-SCNC: 8.6 MG/DL (ref 8.6–10.5)
CHLORIDE SERPL-SCNC: 109 MMOL/L (ref 98–107)
CO2 SERPL-SCNC: 26.5 MMOL/L (ref 22–29)
CREAT SERPL-MCNC: 0.84 MG/DL (ref 0.57–1)
DEPRECATED RDW RBC AUTO: 53.2 FL (ref 37–54)
EGFRCR SERPLBLD CKD-EPI 2021: 69.9 ML/MIN/1.73
EOSINOPHIL # BLD AUTO: 0.13 10*3/MM3 (ref 0–0.4)
EOSINOPHIL NFR BLD AUTO: 1.8 % (ref 0.3–6.2)
ERYTHROCYTE [DISTWIDTH] IN BLOOD BY AUTOMATED COUNT: 14.9 % (ref 12.3–15.4)
GLUCOSE SERPL-MCNC: 85 MG/DL (ref 65–99)
HCT VFR BLD AUTO: 37.2 % (ref 34–46.6)
HGB BLD-MCNC: 11.3 G/DL (ref 12–15.9)
IMM GRANULOCYTES # BLD AUTO: 0.02 10*3/MM3 (ref 0–0.05)
IMM GRANULOCYTES NFR BLD AUTO: 0.3 % (ref 0–0.5)
LYMPHOCYTES # BLD AUTO: 1.97 10*3/MM3 (ref 0.7–3.1)
LYMPHOCYTES NFR BLD AUTO: 27.8 % (ref 19.6–45.3)
MAGNESIUM SERPL-MCNC: 2 MG/DL (ref 1.6–2.4)
MCH RBC QN AUTO: 29.6 PG (ref 26.6–33)
MCHC RBC AUTO-ENTMCNC: 30.4 G/DL (ref 31.5–35.7)
MCV RBC AUTO: 97.4 FL (ref 79–97)
MONOCYTES # BLD AUTO: 0.84 10*3/MM3 (ref 0.1–0.9)
MONOCYTES NFR BLD AUTO: 11.8 % (ref 5–12)
NEUTROPHILS NFR BLD AUTO: 4.09 10*3/MM3 (ref 1.7–7)
NEUTROPHILS NFR BLD AUTO: 57.7 % (ref 42.7–76)
NRBC BLD AUTO-RTO: 0 /100 WBC (ref 0–0.2)
PHOSPHATE SERPL-MCNC: 3 MG/DL (ref 2.5–4.5)
PLATELET # BLD AUTO: 173 10*3/MM3 (ref 140–450)
PMV BLD AUTO: 11.6 FL (ref 6–12)
POTASSIUM SERPL-SCNC: 4 MMOL/L (ref 3.5–5.2)
RBC # BLD AUTO: 3.82 10*6/MM3 (ref 3.77–5.28)
SODIUM SERPL-SCNC: 143 MMOL/L (ref 136–145)
WBC NRBC COR # BLD AUTO: 7.09 10*3/MM3 (ref 3.4–10.8)

## 2024-02-01 PROCEDURE — 83735 ASSAY OF MAGNESIUM: CPT | Performed by: STUDENT IN AN ORGANIZED HEALTH CARE EDUCATION/TRAINING PROGRAM

## 2024-02-01 PROCEDURE — 84100 ASSAY OF PHOSPHORUS: CPT | Performed by: STUDENT IN AN ORGANIZED HEALTH CARE EDUCATION/TRAINING PROGRAM

## 2024-02-01 PROCEDURE — 99232 SBSQ HOSP IP/OBS MODERATE 35: CPT | Performed by: STUDENT IN AN ORGANIZED HEALTH CARE EDUCATION/TRAINING PROGRAM

## 2024-02-01 PROCEDURE — 99231 SBSQ HOSP IP/OBS SF/LOW 25: CPT | Performed by: SURGERY

## 2024-02-01 PROCEDURE — 25010000002 LEVETIRACETAM IN NACL 0.82% 500 MG/100ML SOLUTION: Performed by: STUDENT IN AN ORGANIZED HEALTH CARE EDUCATION/TRAINING PROGRAM

## 2024-02-01 PROCEDURE — 80048 BASIC METABOLIC PNL TOTAL CA: CPT | Performed by: STUDENT IN AN ORGANIZED HEALTH CARE EDUCATION/TRAINING PROGRAM

## 2024-02-01 PROCEDURE — 85025 COMPLETE CBC W/AUTO DIFF WBC: CPT | Performed by: STUDENT IN AN ORGANIZED HEALTH CARE EDUCATION/TRAINING PROGRAM

## 2024-02-01 PROCEDURE — 25010000002 ENOXAPARIN PER 10 MG: Performed by: FAMILY MEDICINE

## 2024-02-01 RX ADMIN — Medication 10 ML: at 21:21

## 2024-02-01 RX ADMIN — LEVETIRACETAM 500 MG: 5 INJECTION, SOLUTION INTRAVENOUS at 21:24

## 2024-02-01 RX ADMIN — ENOXAPARIN SODIUM 40 MG: 100 INJECTION SUBCUTANEOUS at 08:41

## 2024-02-01 RX ADMIN — MONTELUKAST 10 MG: 10 TABLET, FILM COATED ORAL at 21:18

## 2024-02-01 RX ADMIN — DONEPEZIL HYDROCHLORIDE 10 MG: 10 TABLET, FILM COATED ORAL at 21:18

## 2024-02-01 RX ADMIN — LEVETIRACETAM 500 MG: 5 INJECTION, SOLUTION INTRAVENOUS at 08:41

## 2024-02-01 RX ADMIN — DOCUSATE SODIUM 50MG AND SENNOSIDES 8.6MG 2 TABLET: 8.6; 5 TABLET, FILM COATED ORAL at 21:18

## 2024-02-01 RX ADMIN — QUETIAPINE FUMARATE 50 MG: 25 TABLET ORAL at 21:18

## 2024-02-01 RX ADMIN — PANTOPRAZOLE SODIUM 40 MG: 40 INJECTION, POWDER, FOR SOLUTION INTRAVENOUS at 05:18

## 2024-02-01 NOTE — PLAN OF CARE
Goal Outcome Evaluation:         Patient disoriented x3, no complaints of pain. Clear liquid diet tolerated well.

## 2024-02-01 NOTE — CONSULTS
"Nutrition Services    Patient Name: Sonja Gunn  YOB: 1940  MRN: [id number]  Admission date: 1/30/2024      CLINICAL NUTRITION ASSESSMENT      Reason for Assessment  MST Score 2+     H&P:  Past Medical History:   Diagnosis Date    Alzheimer disease         Current Problems:   Active Hospital Problems    Diagnosis     **Generalized abdominal pain     Gastric outlet obstruction     Hiatal hernia     Constipation     History of dementia         Nutrition/Diet History         Narrative   Pt was sleeping soundly at my visit this morning. Family at bedside provides information. They report that pt was last able to eat 3 days ago. Prior to this, intake has been adequate with no issues. Family denies recent weight loss. Family denies issues with chewing or swallowing. Noted family would like to avoid surgery or extensive interventions. Although further rec's pending upper GI. Pt was still NPO at my visit but noted a clear liquid diet was just ordered. Will order Boost Breeze and monitor acceptance/need for adjustments.      Anthropometrics        Current Height, Weight Height: 162.6 cm (64\")  Weight: 60.3 kg (132 lb 15 oz)   Current BMI Body mass index is 22.82 kg/m².   BMI Classification Normal range   % %    Adjusted Body Weight (ABW)    Weight Hx  Wt Readings from Last 30 Encounters:   01/31/24 0310 60.3 kg (132 lb 15 oz)   01/30/24 2221 61.8 kg (136 lb 3.9 oz)   01/11/24 2112 65.3 kg (143 lb 15.4 oz)   02/27/23 1358 65.3 kg (143 lb 15.4 oz)   03/29/22 1204 60.9 kg (134 lb 4.2 oz)   07/14/21 1250 66.7 kg (147 lb)   07/11/21 1550 66.9 kg (147 lb 7.8 oz)   07/02/21 2219 64.9 kg (143 lb 1.3 oz)          Wt Change Observation Weight loss denied by family. Although per chart pt has lost at least 7# over the past 3 weeks (5% weight change)      Estimated/Assessed Needs  Estimated Needs based on: Current Body Weight       Energy Requirements 25-30 kcal/kg   EST Needs (kcal/day) 5678-1532 kcal/day      " "  Protein Requirements 1.0-1.2 g/kg   EST Daily Needs (g/day) 60-72 g/day        Fluid Requirements 1 ml/kcal    Estimated Needs (mL/day) 4518-3359 ml/day      Labs/Medications         Pertinent Labs Reviewed.   Results from last 7 days   Lab Units 02/01/24  0453 01/30/24  2111   SODIUM mmol/L 143 141   POTASSIUM mmol/L 4.0 4.0   CHLORIDE mmol/L 109* 102   CO2 mmol/L 26.5 27.9   BUN mg/dL 14 14   CREATININE mg/dL 0.84 0.90   CALCIUM mg/dL 8.6 9.3   BILIRUBIN mg/dL  --  0.4   ALK PHOS U/L  --  77   ALT (SGPT) U/L  --  12   AST (SGOT) U/L  --  14   GLUCOSE mg/dL 85 139*     Results from last 7 days   Lab Units 02/01/24  0453   MAGNESIUM mg/dL 2.0   PHOSPHORUS mg/dL 3.0   HEMOGLOBIN g/dL 11.3*   HEMATOCRIT % 37.2     COVID19   Date Value Ref Range Status   01/31/2024 Not Detected Not Detected - Ref. Range Final     No results found for: \"HGBA1C\"      Pertinent Medications Reviewed.     Malnutrition Severity Assessment              Nutrition Diagnosis         Nutrition Dx Problem 1 Altered GI function related to  hiatal hernia with possible volvulus or obstruction  as evidenced by  need for NPO status      Nutrition Intervention           Current Nutrition Orders & Evaluation of Intake       Current PO Diet Diet: Liquid Diets; Clear Liquid; Fluid Consistency: Thin (IDDSI 0)   Supplement No active supplement orders           Nutrition Intervention/Prescription        Monitor surgery plans, order Boost Breeze BID (each supplement contains 250 kcal, 9g protein), monitor diet advancement         Medical Nutrition Therapy/Nutrition Education          Learner     Readiness Family  Acceptance     Method     Response Explanation  Verbalizes understanding     Monitor/Evaluation        Monitor PO intake, Supplement intake, POC/GOC, Diet advancement     Nutrition Discharge Plan         To be determined     Electronically signed by:  Janet Chilel RD  02/01/24 09:55 EST   "

## 2024-02-01 NOTE — PLAN OF CARE
Goal Outcome Evaluation:      PT RESTING QUIETLY IN BED. NO SIGNS OF PAIN OR DISTRESS NOTED AT THIS TIME. CALL LIGHT IN REACH.

## 2024-02-01 NOTE — PROGRESS NOTES
[facility]   Hospitalist Progress Note  Date: 2024  Patient Name: Sonja Gunn  : 1940  MRN: [id number]  Date of admission: 2024  Room/Bed: [facility]/      Subjective   Subjective     Chief Complaint: Nausea and vomiting    Summary:Patient is a 83-year-old female with past medical history of advanced dementia, hypertension and GERD who presented with abdominal pain, nausea and vomiting from her assisted living facility.  Patient is nonverbal.  History was obtained from physician handoff and chart review by the admitting physician.  Patient started having abdominal discomfort 1 day prior to presentation followed by vomiting.  They were concerned about possible COVID so she was brought to the ED for further evaluation.  Patient was hypoxic in the ED requiring 2 L/min of oxygen via NC to maintain saturation.  Lab work was nonsignificant.  Chest x-ray showed no significant interval changes compared to prior study which showed large diaphragmatic hernia with associated atelectasis at the lung bases.  CT abdomen pelvis with contrast was obtained which showed a very large hiatal hernia with dilated stomach and small bowel and colon; associated with gastric outlet obstruction; mesentery volvulus with strangulation of small bowel within the hiatal hernia cannot be excluded; very large stool burden; dilated fluid-filled esophagus at the level of upper thorax.  Surgery on-call was contacted by the ED physician who recommended NG tube and GI consult.  Patient was then admitted for further evaluation and management.  General surgery following the patient.       Interval Followup: Patient underwent upper GI fluoroscopy yesterday which showed large hiatal hernia and distention of proximal duodenum which was slow to empty, mild related narrowing involving the duodenum but no complete obstruction.  Patient is lying comfortably on her bed, not in acute distress.  Nonverbal.    Review of Systems    Unable to  obtain.      Objective   Objective     Vitals:   Temp:  [97.3 °F (36.3 °C)-98.1 °F (36.7 °C)] 97.3 °F (36.3 °C)  Heart Rate:  [47-67] 61  Resp:  [18-20] 18  BP: ()/(54-72) 92/66  Flow (L/min):  [0.5-2] 0.5    Physical Exam   General: Awake, NAD  Cardiovascular: RRR, no murmurs   Pulmonary: CTA bilaterally; no wheezes; no conversational dyspnea  Gastrointestinal: S/Distended/NT, +BS      Result Review    Result Review:  I have personally reviewed these results:  [x]  Laboratory      Lab 02/01/24 0453 01/31/24 0830 01/30/24 2111   WBC 7.09  --  8.77   HEMOGLOBIN 11.3*  --  13.9   HEMATOCRIT 37.2  --  43.7   PLATELETS 173  --  220   NEUTROS ABS 4.09  --  7.80*   IMMATURE GRANS (ABS) 0.02  --  0.03   LYMPHS ABS 1.97  --  0.68*   MONOS ABS 0.84  --  0.25   EOS ABS 0.13  --  0.00   MCV 97.4*  --  94.6   LACTATE  --  1.0  --          Lab 02/01/24 0453 01/30/24 2111   SODIUM 143 141   POTASSIUM 4.0 4.0   CHLORIDE 109* 102   CO2 26.5 27.9   ANION GAP 7.5 11.1   BUN 14 14   CREATININE 0.84 0.90   EGFR 69.9 64.4   GLUCOSE 85 139*   CALCIUM 8.6 9.3   MAGNESIUM 2.0  --    PHOSPHORUS 3.0  --          Lab 01/30/24 2111   TOTAL PROTEIN 7.5   ALBUMIN 4.1   GLOBULIN 3.4   ALT (SGPT) 12   AST (SGOT) 14   BILIRUBIN 0.4   ALK PHOS 77         Lab 01/30/24 2111   PROBNP 195.8   HSTROP T 12                 Brief Urine Lab Results  (Last result in the past 365 days)        Color   Clarity   Blood   Leuk Est   Nitrite   Protein   CREAT   Urine HCG        01/11/24 2018 Yellow   Clear   Negative   Trace   Negative   Negative                 [x]  Microbiology   Microbiology Results (last 10 days)       Procedure Component Value - Date/Time    Respiratory Panel PCR w/COVID-19(SARS-CoV-2) KATRINA/JONES/RUSSELL/PAD/COR/ANDREWS In-House, NP Swab in UTM/VTM, 2 HR TAT - Swab, Nasopharynx [[id number]]  (Normal) Collected: 01/31/24 1749    Lab Status: Final result Specimen: Swab from Nasopharynx Updated: 01/31/24 5957     ADENOVIRUS, PCR Not Detected      Coronavirus 229E Not Detected     Coronavirus HKU1 Not Detected     Coronavirus NL63 Not Detected     Coronavirus OC43 Not Detected     COVID19 Not Detected     Human Metapneumovirus Not Detected     Human Rhinovirus/Enterovirus Not Detected     Influenza A PCR Not Detected     Influenza B PCR Not Detected     Parainfluenza Virus 1 Not Detected     Parainfluenza Virus 2 Not Detected     Parainfluenza Virus 3 Not Detected     Parainfluenza Virus 4 Not Detected     RSV, PCR Not Detected     Bordetella pertussis pcr Not Detected     Bordetella parapertussis PCR Not Detected     Chlamydophila pneumoniae PCR Not Detected     Mycoplasma pneumo by PCR Not Detected    Narrative:      In the setting of a positive respiratory panel with a viral infection PLUS a negative procalcitonin without other underlying concern for bacterial infection, consider observing off antibiotics or discontinuation of antibiotics and continue supportive care. If the respiratory panel is positive for atypical bacterial infection (Bordetella pertussis, Chlamydophila pneumoniae, or Mycoplasma pneumoniae), consider antibiotic de-escalation to target atypical bacterial infection.    COVID-19, FLU A/B, RSV PCR 1 HR TAT - Swab, Nasopharynx [[id number]]  (Normal) Collected: 01/30/24 2107    Lab Status: Final result Specimen: Swab from Nasopharynx Updated: 01/30/24 2203     COVID19 Not Detected     Influenza A PCR Not Detected     Influenza B PCR Not Detected     RSV, PCR Not Detected    Narrative:      Fact sheet for providers: https://www.fda.gov/media/577560/download    Fact sheet for patients: https://www.fda.gov/media/919626/download    Test performed by PCR.          [x]  Radiology  FL Upper GI Water Soluble    Result Date: 1/31/2024  1. Examination limited secondary to patient's history of dementia and inability to stand for prolonged periods of time.  Patient ingested about 40 cubic centimeters of water-soluble contrast. 2. Large hiatal hernia with  most of the stomach in an intrathoracic location.  Organo-axial position similar to previous CT scan including CT from 11/2/2020.  3. There is distention of proximal duodenum which was slow to empty with to and fro motion observed.  There is mild relative narrowing involving the duodenum as it crosses midline.  No complete obstruction is evident.  When patient was turned into a left lateral position, contrast emptied twice into the proximal small bowel twice during the exam.       Exam directly supervised by Dr. Neftali BELLAMY       Electronically Signed and Approved By: DELVIS MATTSON MD on 1/31/2024 at 14:25             CT Abdomen Pelvis With Contrast    Result Date: 1/31/2024    1. A very large hiatal hernia is seen, which contains a dilated stomach and small bowel and colon as well as other structures.  An associated gastric outlet obstruction (such as related to an organoaxial volvulus) cannot be excluded.  Mesenteric volvulus with strangulation of small bowel within the hiatal hernia cannot be excluded; however, the small bowel is nondilated.  No pneumatosis or portal or mesenteric venous gas is seen to suggest ischemic bowel.  No definite extraluminal fluid collections are seen to suggest perforation.  No pneumoperitoneum.  2. A very large stool burden is seen.  There may be fecal stasis (or fecal retention) as with constipation.  Fecal impaction is possible.  Age-indeterminate stercoral colo-proctitis cannot be excluded.  No definite stercoral ulceration.  No perirectal fluid collection is seen to suggest abscess.  The maximum AP diameter of the rectum is about 11.3 cm.  3. The appendix is not clearly identified.  4. The gallbladder is distended.  There are gallstones without definite acute cholecystitis.  Extrinsic deformity of the cystic duct is seen, as discussed above.  No acute pancreatitis.  5. The study is very limited for several reasons, as detailed above.  6. There is a dilated  fluid-filled esophagus (estimated at 4 cm in maximum diameter) at the level of the upper thorax. 7. Please see above comments for further detail.  1.   Please note that portions of this note were completed with a voice recognition program.  LUCIANO ESTES JR, MD       Electronically Signed and Approved By: LUCIANO ESTES JR, MD on 1/31/2024 at 0:49              XR Chest 1 View    Result Date: 1/30/2024    No significant interval change is suggested radiographically since prior studies.  Please see above comments for further detail.      Please note that portions of this note were completed with a voice recognition program.  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  Surgery following the patient, appreciate further input.  Patient underwent upper GI fluoroscopy yesterday which showed large hiatal hernia and distention of proximal duodenum which was slow to empty, mild related narrowing involving the duodenum but no complete obstruction.  No gastric outlet obstruction present.  Started on full liquid diet, will advance as tolerated to puréed diet.  Family does not want surgery or any extensive interventions.  Continue Keppra 500 mg twice daily.  Continue rest of the current management.         DVT prophylaxis:  Medical DVT prophylaxis orders are present.        CODE STATUS:   Medical Intervention Limits: NO intubation (DNI)  Level Of Support Discussed With: Health Care  Surrogate  Code Status (Patient has no pulse and is not breathing): No CPR (Do Not Attempt to Resuscitate)  Medical Interventions (Patient has pulse or is breathing): Limited Support      Electronically signed by Patricia Teixeira MD, 02/01/24, 8:03 AM EST.

## 2024-02-01 NOTE — PROGRESS NOTES
[facility]     Progress Note    Patient Name: Sonja Gunn  : 1940  MRN: [id number]  Primary Care Physician:  Mike Cordoba MD  Date of admission: 2024    Subjective   Subjective     Chief Complaint: Nausea vomiting    HPI:  Patient sleeping.  At baseline disoriented.      Objective   Objective     Vitals:   Temp:  [97.3 °F (36.3 °C)-98.1 °F (36.7 °C)] 97.6 °F (36.4 °C)  Heart Rate:  [47-63] 63  Resp:  [16-20] 16  BP: ()/(54-72) 126/68  Flow (L/min):  [0.5] 0.5    Physical Exam  Constitutional:       Appearance: Normal appearance.   Cardiovascular:      Rate and Rhythm: Normal rate.   Pulmonary:      Effort: Pulmonary effort is normal.   Abdominal:      Palpations: Abdomen is soft.         Result Review    Result Review:  I have personally reviewed the results from the time of this admission to 2024 15:13 EST and agree with these findings:  []  Laboratory  []  Microbiology  [x]  Radiology  []  EKG/Telemetry   []  Cardiology/Vascular   []  Pathology  []  Old records  []  Other:  Most notable findings include: Upper GI showing contrast through to the duodenum and similar appearance to her remote prior CT    Assessment & Plan   Assessment / Plan     Brief Patient Summary:  Sonja Gunn is a 83 y.o. female who has a large hiatal hernia concurrent with advanced Alzheimer's and multiple medical issues    Active Hospital Problems:  Active Hospital Problems    Diagnosis     **Generalized abdominal pain     Gastric outlet obstruction     Hiatal hernia     Constipation     History of dementia      Plan:  Upper GI shows no real evidence of gastric volvulus or gastric outlet obstruction  Contrast does make it through to the duodenum although the transit is slow  Family does not desire surgery and I think she would be a poor surgical candidate  I do not think she requires any urgent surgical intervention  I would recommend starting with clear liquids if she does well can advance her up to a  puréed diet-I think indefinitely she should be on purée  If there are any questions about her swallowing ability or capabilities of speech evaluation could be considered, however she seemed to do relatively well with swallowing the oral contrast  Not much more to contribute from a surgical standpoint  I will be available as needed       DVT prophylaxis:  Medical DVT prophylaxis orders are present.        CODE STATUS:   Medical Intervention Limits: NO intubation (DNI)  Level Of Support Discussed With: Health Care Surrogate  Code Status (Patient has no pulse and is not breathing): No CPR (Do Not Attempt to Resuscitate)  Medical Interventions (Patient has pulse or is breathing): Limited Support    Disposition:  I expect patient to be discharged per primary.    Electronically signed by Víctor Shultz MD, 02/01/24, 3:13 PM EST.

## 2024-02-02 LAB
ANION GAP SERPL CALCULATED.3IONS-SCNC: 10.9 MMOL/L (ref 5–15)
BACTERIA UR QL AUTO: ABNORMAL /HPF
BASOPHILS # BLD AUTO: 0.02 10*3/MM3 (ref 0–0.2)
BASOPHILS NFR BLD AUTO: 0.2 % (ref 0–1.5)
BILIRUB UR QL STRIP: NEGATIVE
BUN SERPL-MCNC: 12 MG/DL (ref 8–23)
BUN/CREAT SERPL: 16.7 (ref 7–25)
CALCIUM SPEC-SCNC: 8.5 MG/DL (ref 8.6–10.5)
CHLORIDE SERPL-SCNC: 106 MMOL/L (ref 98–107)
CLARITY UR: CLEAR
CO2 SERPL-SCNC: 22.1 MMOL/L (ref 22–29)
COLOR UR: YELLOW
CREAT SERPL-MCNC: 0.72 MG/DL (ref 0.57–1)
DEPRECATED RDW RBC AUTO: 50.5 FL (ref 37–54)
EGFRCR SERPLBLD CKD-EPI 2021: 84.1 ML/MIN/1.73
EOSINOPHIL # BLD AUTO: 0.16 10*3/MM3 (ref 0–0.4)
EOSINOPHIL NFR BLD AUTO: 2 % (ref 0.3–6.2)
ERYTHROCYTE [DISTWIDTH] IN BLOOD BY AUTOMATED COUNT: 14.5 % (ref 12.3–15.4)
GLUCOSE SERPL-MCNC: 93 MG/DL (ref 65–99)
GLUCOSE UR STRIP-MCNC: NEGATIVE MG/DL
HCT VFR BLD AUTO: 38.9 % (ref 34–46.6)
HGB BLD-MCNC: 12.5 G/DL (ref 12–15.9)
HGB UR QL STRIP.AUTO: NEGATIVE
HYALINE CASTS UR QL AUTO: ABNORMAL /LPF
IMM GRANULOCYTES # BLD AUTO: 0.02 10*3/MM3 (ref 0–0.05)
IMM GRANULOCYTES NFR BLD AUTO: 0.2 % (ref 0–0.5)
KETONES UR QL STRIP: NEGATIVE
LEUKOCYTE ESTERASE UR QL STRIP.AUTO: ABNORMAL
LYMPHOCYTES # BLD AUTO: 2.21 10*3/MM3 (ref 0.7–3.1)
LYMPHOCYTES NFR BLD AUTO: 27.6 % (ref 19.6–45.3)
MAGNESIUM SERPL-MCNC: 1.8 MG/DL (ref 1.6–2.4)
MCH RBC QN AUTO: 30.5 PG (ref 26.6–33)
MCHC RBC AUTO-ENTMCNC: 32.1 G/DL (ref 31.5–35.7)
MCV RBC AUTO: 94.9 FL (ref 79–97)
MONOCYTES # BLD AUTO: 0.96 10*3/MM3 (ref 0.1–0.9)
MONOCYTES NFR BLD AUTO: 12 % (ref 5–12)
NEUTROPHILS NFR BLD AUTO: 4.64 10*3/MM3 (ref 1.7–7)
NEUTROPHILS NFR BLD AUTO: 58 % (ref 42.7–76)
NITRITE UR QL STRIP: NEGATIVE
NRBC BLD AUTO-RTO: 0 /100 WBC (ref 0–0.2)
PH UR STRIP.AUTO: 8 [PH] (ref 5–8)
PHOSPHATE SERPL-MCNC: 2.6 MG/DL (ref 2.5–4.5)
PLATELET # BLD AUTO: 167 10*3/MM3 (ref 140–450)
PMV BLD AUTO: 11.6 FL (ref 6–12)
POTASSIUM SERPL-SCNC: 3.4 MMOL/L (ref 3.5–5.2)
PROT UR QL STRIP: NEGATIVE
RBC # BLD AUTO: 4.1 10*6/MM3 (ref 3.77–5.28)
RBC # UR STRIP: ABNORMAL /HPF
REF LAB TEST METHOD: ABNORMAL
SODIUM SERPL-SCNC: 139 MMOL/L (ref 136–145)
SP GR UR STRIP: 1.01 (ref 1–1.03)
SQUAMOUS #/AREA URNS HPF: ABNORMAL /HPF
UROBILINOGEN UR QL STRIP: ABNORMAL
WBC # UR STRIP: ABNORMAL /HPF
WBC NRBC COR # BLD AUTO: 8.01 10*3/MM3 (ref 3.4–10.8)

## 2024-02-02 PROCEDURE — 85025 COMPLETE CBC W/AUTO DIFF WBC: CPT | Performed by: STUDENT IN AN ORGANIZED HEALTH CARE EDUCATION/TRAINING PROGRAM

## 2024-02-02 PROCEDURE — 25010000002 LEVETIRACETAM IN NACL 0.82% 500 MG/100ML SOLUTION: Performed by: STUDENT IN AN ORGANIZED HEALTH CARE EDUCATION/TRAINING PROGRAM

## 2024-02-02 PROCEDURE — 81001 URINALYSIS AUTO W/SCOPE: CPT

## 2024-02-02 PROCEDURE — 80048 BASIC METABOLIC PNL TOTAL CA: CPT | Performed by: STUDENT IN AN ORGANIZED HEALTH CARE EDUCATION/TRAINING PROGRAM

## 2024-02-02 PROCEDURE — 25010000002 POTASSIUM CHLORIDE 10 MEQ/100ML SOLUTION: Performed by: STUDENT IN AN ORGANIZED HEALTH CARE EDUCATION/TRAINING PROGRAM

## 2024-02-02 PROCEDURE — 84100 ASSAY OF PHOSPHORUS: CPT | Performed by: STUDENT IN AN ORGANIZED HEALTH CARE EDUCATION/TRAINING PROGRAM

## 2024-02-02 PROCEDURE — 99232 SBSQ HOSP IP/OBS MODERATE 35: CPT | Performed by: STUDENT IN AN ORGANIZED HEALTH CARE EDUCATION/TRAINING PROGRAM

## 2024-02-02 PROCEDURE — 87086 URINE CULTURE/COLONY COUNT: CPT

## 2024-02-02 PROCEDURE — 83735 ASSAY OF MAGNESIUM: CPT | Performed by: STUDENT IN AN ORGANIZED HEALTH CARE EDUCATION/TRAINING PROGRAM

## 2024-02-02 PROCEDURE — 25010000002 ENOXAPARIN PER 10 MG: Performed by: FAMILY MEDICINE

## 2024-02-02 RX ORDER — POTASSIUM CHLORIDE 7.45 MG/ML
10 INJECTION INTRAVENOUS
Status: COMPLETED | OUTPATIENT
Start: 2024-02-02 | End: 2024-02-02

## 2024-02-02 RX ADMIN — LEVETIRACETAM 500 MG: 5 INJECTION, SOLUTION INTRAVENOUS at 20:56

## 2024-02-02 RX ADMIN — DONEPEZIL HYDROCHLORIDE 10 MG: 10 TABLET, FILM COATED ORAL at 20:57

## 2024-02-02 RX ADMIN — POTASSIUM CHLORIDE 10 MEQ: 7.46 INJECTION, SOLUTION INTRAVENOUS at 11:02

## 2024-02-02 RX ADMIN — VILAZODONE HYDROCHLORIDE 20 MG: 20 TABLET ORAL at 09:29

## 2024-02-02 RX ADMIN — POTASSIUM CHLORIDE 10 MEQ: 7.46 INJECTION, SOLUTION INTRAVENOUS at 09:57

## 2024-02-02 RX ADMIN — QUETIAPINE FUMARATE 50 MG: 25 TABLET ORAL at 20:57

## 2024-02-02 RX ADMIN — MONTELUKAST 10 MG: 10 TABLET, FILM COATED ORAL at 21:00

## 2024-02-02 RX ADMIN — Medication 10 ML: at 20:58

## 2024-02-02 RX ADMIN — PANTOPRAZOLE SODIUM 40 MG: 40 INJECTION, POWDER, FOR SOLUTION INTRAVENOUS at 05:17

## 2024-02-02 RX ADMIN — Medication 10 ML: at 09:29

## 2024-02-02 RX ADMIN — POTASSIUM CHLORIDE 10 MEQ: 7.46 INJECTION, SOLUTION INTRAVENOUS at 12:13

## 2024-02-02 RX ADMIN — LEVETIRACETAM 500 MG: 5 INJECTION, SOLUTION INTRAVENOUS at 09:29

## 2024-02-02 RX ADMIN — ENOXAPARIN SODIUM 40 MG: 100 INJECTION SUBCUTANEOUS at 09:29

## 2024-02-02 NOTE — PROGRESS NOTES
[facility]   Hospitalist Progress Note  Date: 2024  Patient Name: Sonja Gunn  : 1940  MRN: [id number]  Date of admission: 2024  Room/Bed: [facility]/      Subjective   Subjective     Chief Complaint: Nausea and vomiting    Summary:Patient is a 83-year-old female with past medical history of advanced dementia, hypertension and GERD who presented with abdominal pain, nausea and vomiting from her assisted living facility.  Patient is nonverbal.  History was obtained from physician handoff and chart review by the admitting physician.  Patient started having abdominal discomfort 1 day prior to presentation followed by vomiting.  They were concerned about possible COVID so she was brought to the ED for further evaluation.  Patient was hypoxic in the ED requiring 2 L/min of oxygen via NC to maintain saturation.  Lab work was nonsignificant.  Chest x-ray showed no significant interval changes compared to prior study which showed large diaphragmatic hernia with associated atelectasis at the lung bases.  CT abdomen pelvis with contrast was obtained which showed a very large hiatal hernia with dilated stomach and small bowel and colon; associated with gastric outlet obstruction; mesentery volvulus with strangulation of small bowel within the hiatal hernia cannot be excluded; very large stool burden; dilated fluid-filled esophagus at the level of upper thorax.  Surgery on-call was contacted by the ED physician who recommended NG tube and GI consult.  Patient was then admitted for further evaluation and management.  General surgery following the patient.  Patient underwent fluoroscopy upper GI on 2024 which showed large iatal hernia and distention of proximal duodenum which was slow to empty, mild related narrowing involving the duodenum but no complete obstruction; no concern for gastric outlet obstruction.  Started on full liquid diet.  Family did not want any surgery or any extensive interventions  with the patient.       Interval Followup:   No acute events overnight.  Patient more awake today.  But not able to follow commands or answer questions.    Review of Systems    Unable to obtain.      Objective   Objective     Vitals:   Temp:  [97.9 °F (36.6 °C)-98.4 °F (36.9 °C)] 98.1 °F (36.7 °C)  Heart Rate:  [63-71] 63  Resp:  [16-18] 18  BP: (101-117)/(58-71) 110/64    Physical Exam   General: Awake, NAD  Cardiovascular: RRR, no murmurs   Pulmonary: CTA bilaterally; no wheezes; no conversational dyspnea  Gastrointestinal: S/ND/NT, +BS      Result Review    Result Review:  I have personally reviewed these results:  x laboratory      Lab 02/02/24  0536 02/01/24  0453 01/31/24 0830 01/30/24 2111   WBC 8.01 7.09  --  8.77   HEMOGLOBIN 12.5 11.3*  --  13.9   HEMATOCRIT 38.9 37.2  --  43.7   PLATELETS 167 173  --  220   NEUTROS ABS 4.64 4.09  --  7.80*   IMMATURE GRANS (ABS) 0.02 0.02  --  0.03   LYMPHS ABS 2.21 1.97  --  0.68*   MONOS ABS 0.96* 0.84  --  0.25   EOS ABS 0.16 0.13  --  0.00   MCV 94.9 97.4*  --  94.6   LACTATE  --   --  1.0  --          Lab 02/02/24  0536 02/01/24  0453 01/30/24 2111   SODIUM 139 143 141   POTASSIUM 3.4* 4.0 4.0   CHLORIDE 106 109* 102   CO2 22.1 26.5 27.9   ANION GAP 10.9 7.5 11.1   BUN 12 14 14   CREATININE 0.72 0.84 0.90   EGFR 84.1 69.9 64.4   GLUCOSE 93 85 139*   CALCIUM 8.5* 8.6 9.3   MAGNESIUM 1.8 2.0  --    PHOSPHORUS 2.6 3.0  --          Lab 01/30/24 2111   TOTAL PROTEIN 7.5   ALBUMIN 4.1   GLOBULIN 3.4   ALT (SGPT) 12   AST (SGOT) 14   BILIRUBIN 0.4   ALK PHOS 77         Lab 01/30/24  2111   PROBNP 195.8   HSTROP T 12                 Brief Urine Lab Results  (Last result in the past 365 days)        Color   Clarity   Blood   Leuk Est   Nitrite   Protein   CREAT   Urine HCG        01/11/24 2018 Yellow   Clear   Negative   Trace   Negative   Negative                 x microbiology   Microbiology Results (last 10 days)       Procedure Component Value - Date/Time     Respiratory Panel PCR w/COVID-19(SARS-CoV-2) KATRINA/JONES/RUSSELL/PAD/COR/ANDREWS In-House, NP Swab in UTM/VTM, 2 HR TAT - Swab, Nasopharynx [[id number]]  (Normal) Collected: 01/31/24 1747    Lab Status: Final result Specimen: Swab from Nasopharynx Updated: 01/31/24 1857     ADENOVIRUS, PCR Not Detected     Coronavirus 229E Not Detected     Coronavirus HKU1 Not Detected     Coronavirus NL63 Not Detected     Coronavirus OC43 Not Detected     COVID19 Not Detected     Human Metapneumovirus Not Detected     Human Rhinovirus/Enterovirus Not Detected     Influenza A PCR Not Detected     Influenza B PCR Not Detected     Parainfluenza Virus 1 Not Detected     Parainfluenza Virus 2 Not Detected     Parainfluenza Virus 3 Not Detected     Parainfluenza Virus 4 Not Detected     RSV, PCR Not Detected     Bordetella pertussis pcr Not Detected     Bordetella parapertussis PCR Not Detected     Chlamydophila pneumoniae PCR Not Detected     Mycoplasma pneumo by PCR Not Detected    Narrative:      In the setting of a positive respiratory panel with a viral infection PLUS a negative procalcitonin without other underlying concern for bacterial infection, consider observing off antibiotics or discontinuation of antibiotics and continue supportive care. If the respiratory panel is positive for atypical bacterial infection (Bordetella pertussis, Chlamydophila pneumoniae, or Mycoplasma pneumoniae), consider antibiotic de-escalation to target atypical bacterial infection.    COVID-19, FLU A/B, RSV PCR 1 HR TAT - Swab, Nasopharynx [[id number]]  (Normal) Collected: 01/30/24 2107    Lab Status: Final result Specimen: Swab from Nasopharynx Updated: 01/30/24 2203     COVID19 Not Detected     Influenza A PCR Not Detected     Influenza B PCR Not Detected     RSV, PCR Not Detected    Narrative:      Fact sheet for providers: https://www.fda.gov/media/818833/download    Fact sheet for patients: https://www.fda.gov/media/741774/download    Test performed by PCR.           x radiology  FL Upper GI Water Soluble    Result Date: 1/31/2024  1. Examination limited secondary to patient's history of dementia and inability to stand for prolonged periods of time.  Patient ingested about 40 cubic centimeters of water-soluble contrast. 2. Large hiatal hernia with most of the stomach in an intrathoracic location.  Organo-axial position similar to previous CT scan including CT from 11/2/2020.  3. There is distention of proximal duodenum which was slow to empty with to and fro motion observed.  There is mild relative narrowing involving the duodenum as it crosses midline.  No complete obstruction is evident.  When patient was turned into a left lateral position, contrast emptied twice into the proximal small bowel twice during the exam.       Exam directly supervised by Dr. Neftali BELLAMY       Electronically Signed and Approved By: DELVIS MATTSON MD on 1/31/2024 at 14:25             CT Abdomen Pelvis With Contrast    Result Date: 1/31/2024    1. A very large hiatal hernia is seen, which contains a dilated stomach and small bowel and colon as well as other structures.  An associated gastric outlet obstruction (such as related to an organoaxial volvulus) cannot be excluded.  Mesenteric volvulus with strangulation of small bowel within the hiatal hernia cannot be excluded; however, the small bowel is nondilated.  No pneumatosis or portal or mesenteric venous gas is seen to suggest ischemic bowel.  No definite extraluminal fluid collections are seen to suggest perforation.  No pneumoperitoneum.  2. A very large stool burden is seen.  There may be fecal stasis (or fecal retention) as with constipation.  Fecal impaction is possible.  Age-indeterminate stercoral colo-proctitis cannot be excluded.  No definite stercoral ulceration.  No perirectal fluid collection is seen to suggest abscess.  The maximum AP diameter of the rectum is about 11.3 cm.  3. The appendix is not clearly  identified.  4. The gallbladder is distended.  There are gallstones without definite acute cholecystitis.  Extrinsic deformity of the cystic duct is seen, as discussed above.  No acute pancreatitis.  5. The study is very limited for several reasons, as detailed above.  6. There is a dilated fluid-filled esophagus (estimated at 4 cm in maximum diameter) at the level of the upper thorax. 7. Please see above comments for further detail.  1.   Please note that portions of this note were completed with a voice recognition program.  LUCIANO ESTES JR, MD       Electronically Signed and Approved By: LUCIANO ESTES JR, MD on 1/31/2024 at 0:49              XR Chest 1 View    Result Date: 1/30/2024    No significant interval change is suggested radiographically since prior studies.  Please see above comments for further detail.      Please note that portions of this note were completed with a voice recognition program.  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  Surgery following the patient, appreciate further input.  Patient underwent upper GI fluoroscopy on 1/31/2024 which showed large hiatal hernia and distention of proximal duodenum which was slow to empty, mild related narrowing involving the duodenum but no complete obstruction.  No gastric outlet obstruction present.  Started on full liquid diet, tolerated well.  Advancing diet to puréed diet.  Surgery suggesting patient being definitively  on puree diet.  Family does not want surgery or any extensive interventions.  Continue Keppra 500 mg twice daily.  Continue rest of the current management.  Likely discharge in next 24 hours.         DVT prophylaxis:  Medical DVT prophylaxis orders are present.        CODE STATUS:   Medical Intervention Limits: NO intubation (DNI)  Level Of Support Discussed With: Health Care Surrogate  Code Status (Patient has no pulse and is not breathing): No CPR (Do Not Attempt to Resuscitate)  Medical Interventions (Patient has pulse or is breathing): Limited Support      Electronically signed by Patricia Teixeira MD, 02/02/24, 8:03 AM EST.

## 2024-02-02 NOTE — PLAN OF CARE
Goal Outcome Evaluation:         Patient ambulated multiple times to the bathroom and around the unit. No complaints of pain. Patient is confused and oriented x1.

## 2024-02-02 NOTE — PLAN OF CARE
Goal Outcome Evaluation:               Patient VSS. No C/O pain or discomfort this shift. Patient expresses no other needs at this call light within reach.

## 2024-02-03 VITALS
HEIGHT: 64 IN | BODY MASS INDEX: 22.7 KG/M2 | OXYGEN SATURATION: 93 % | SYSTOLIC BLOOD PRESSURE: 106 MMHG | RESPIRATION RATE: 16 BRPM | HEART RATE: 60 BPM | WEIGHT: 132.94 LBS | DIASTOLIC BLOOD PRESSURE: 60 MMHG | TEMPERATURE: 97.9 F

## 2024-02-03 LAB
ANION GAP SERPL CALCULATED.3IONS-SCNC: 9.9 MMOL/L (ref 5–15)
BASOPHILS # BLD AUTO: 0.02 10*3/MM3 (ref 0–0.2)
BASOPHILS NFR BLD AUTO: 0.3 % (ref 0–1.5)
BUN SERPL-MCNC: 6 MG/DL (ref 8–23)
BUN/CREAT SERPL: 9.4 (ref 7–25)
CALCIUM SPEC-SCNC: 8.2 MG/DL (ref 8.6–10.5)
CHLORIDE SERPL-SCNC: 107 MMOL/L (ref 98–107)
CO2 SERPL-SCNC: 23.1 MMOL/L (ref 22–29)
CREAT SERPL-MCNC: 0.64 MG/DL (ref 0.57–1)
DEPRECATED RDW RBC AUTO: 49.6 FL (ref 37–54)
EGFRCR SERPLBLD CKD-EPI 2021: 88.9 ML/MIN/1.73
EOSINOPHIL # BLD AUTO: 0.15 10*3/MM3 (ref 0–0.4)
EOSINOPHIL NFR BLD AUTO: 2.1 % (ref 0.3–6.2)
ERYTHROCYTE [DISTWIDTH] IN BLOOD BY AUTOMATED COUNT: 14.4 % (ref 12.3–15.4)
GLUCOSE SERPL-MCNC: 95 MG/DL (ref 65–99)
HCT VFR BLD AUTO: 36.2 % (ref 34–46.6)
HGB BLD-MCNC: 11.7 G/DL (ref 12–15.9)
IMM GRANULOCYTES # BLD AUTO: 0.03 10*3/MM3 (ref 0–0.05)
IMM GRANULOCYTES NFR BLD AUTO: 0.4 % (ref 0–0.5)
LYMPHOCYTES # BLD AUTO: 2.4 10*3/MM3 (ref 0.7–3.1)
LYMPHOCYTES NFR BLD AUTO: 34.3 % (ref 19.6–45.3)
MAGNESIUM SERPL-MCNC: 1.8 MG/DL (ref 1.6–2.4)
MCH RBC QN AUTO: 30.5 PG (ref 26.6–33)
MCHC RBC AUTO-ENTMCNC: 32.3 G/DL (ref 31.5–35.7)
MCV RBC AUTO: 94.3 FL (ref 79–97)
MONOCYTES # BLD AUTO: 0.98 10*3/MM3 (ref 0.1–0.9)
MONOCYTES NFR BLD AUTO: 14 % (ref 5–12)
NEUTROPHILS NFR BLD AUTO: 3.41 10*3/MM3 (ref 1.7–7)
NEUTROPHILS NFR BLD AUTO: 48.9 % (ref 42.7–76)
NRBC BLD AUTO-RTO: 0 /100 WBC (ref 0–0.2)
PHOSPHATE SERPL-MCNC: 3 MG/DL (ref 2.5–4.5)
PLATELET # BLD AUTO: 167 10*3/MM3 (ref 140–450)
PMV BLD AUTO: 11.8 FL (ref 6–12)
POTASSIUM SERPL-SCNC: 3.2 MMOL/L (ref 3.5–5.2)
RBC # BLD AUTO: 3.84 10*6/MM3 (ref 3.77–5.28)
SODIUM SERPL-SCNC: 140 MMOL/L (ref 136–145)
WBC NRBC COR # BLD AUTO: 6.99 10*3/MM3 (ref 3.4–10.8)

## 2024-02-03 PROCEDURE — 83735 ASSAY OF MAGNESIUM: CPT | Performed by: STUDENT IN AN ORGANIZED HEALTH CARE EDUCATION/TRAINING PROGRAM

## 2024-02-03 PROCEDURE — 85025 COMPLETE CBC W/AUTO DIFF WBC: CPT | Performed by: STUDENT IN AN ORGANIZED HEALTH CARE EDUCATION/TRAINING PROGRAM

## 2024-02-03 PROCEDURE — 99239 HOSP IP/OBS DSCHRG MGMT >30: CPT | Performed by: STUDENT IN AN ORGANIZED HEALTH CARE EDUCATION/TRAINING PROGRAM

## 2024-02-03 PROCEDURE — 25010000002 LEVETIRACETAM IN NACL 0.82% 500 MG/100ML SOLUTION: Performed by: STUDENT IN AN ORGANIZED HEALTH CARE EDUCATION/TRAINING PROGRAM

## 2024-02-03 PROCEDURE — 80048 BASIC METABOLIC PNL TOTAL CA: CPT | Performed by: STUDENT IN AN ORGANIZED HEALTH CARE EDUCATION/TRAINING PROGRAM

## 2024-02-03 PROCEDURE — 25010000002 ENOXAPARIN PER 10 MG: Performed by: FAMILY MEDICINE

## 2024-02-03 PROCEDURE — 25010000002 CALCIUM GLUCONATE-NACL 1-0.675 GM/50ML-% SOLUTION: Performed by: STUDENT IN AN ORGANIZED HEALTH CARE EDUCATION/TRAINING PROGRAM

## 2024-02-03 PROCEDURE — 84100 ASSAY OF PHOSPHORUS: CPT | Performed by: STUDENT IN AN ORGANIZED HEALTH CARE EDUCATION/TRAINING PROGRAM

## 2024-02-03 RX ORDER — CALCIUM GLUCONATE 20 MG/ML
1000 INJECTION, SOLUTION INTRAVENOUS ONCE
Status: COMPLETED | OUTPATIENT
Start: 2024-02-03 | End: 2024-02-03

## 2024-02-03 RX ADMIN — ENOXAPARIN SODIUM 40 MG: 100 INJECTION SUBCUTANEOUS at 08:18

## 2024-02-03 RX ADMIN — PANTOPRAZOLE SODIUM 40 MG: 40 INJECTION, POWDER, FOR SOLUTION INTRAVENOUS at 05:36

## 2024-02-03 RX ADMIN — CALCIUM GLUCONATE 1000 MG: 20 INJECTION, SOLUTION INTRAVENOUS at 09:36

## 2024-02-03 RX ADMIN — LEVETIRACETAM 500 MG: 5 INJECTION, SOLUTION INTRAVENOUS at 08:18

## 2024-02-03 RX ADMIN — VILAZODONE HYDROCHLORIDE 20 MG: 20 TABLET ORAL at 08:19

## 2024-02-03 RX ADMIN — Medication 10 ML: at 08:19

## 2024-02-03 NOTE — PLAN OF CARE
Problem: Skin Injury Risk Increased  Goal: Skin Health and Integrity  Outcome: Met  Intervention: Optimize Skin Protection  Recent Flowsheet Documentation  Taken 2/3/2024 1401 by Comfort Zuluaga RN  Head of Bed (HOB) Positioning: HOB elevated  Taken 2/3/2024 1227 by Comfort Zuluaga RN  Head of Bed (HOB) Positioning: HOB elevated  Taken 2/3/2024 1100 by Comofrt Zuluaga RN  Head of Bed (HOB) Positioning: HOB elevated  Taken 2/3/2024 0936 by Comfort Zuluaga RN  Head of Bed (HOB) Positioning: HOB elevated  Taken 2/3/2024 0715 by Comfort Zuluaga RN  Head of Bed (HOB) Positioning: HOB elevated     Problem: Fall Injury Risk  Goal: Absence of Fall and Fall-Related Injury  Outcome: Met  Intervention: Identify and Manage Contributors  Recent Flowsheet Documentation  Taken 2/3/2024 0818 by Comfort Zuluaga RN  Medication Review/Management:   medications reviewed   high-risk medications identified  Intervention: Promote Injury-Free Environment  Recent Flowsheet Documentation  Taken 2/3/2024 1401 by Comfort Zuluaga RN  Safety Promotion/Fall Prevention:   nonskid shoes/slippers when out of bed   safety round/check completed  Taken 2/3/2024 1227 by Comfort Zuluaga RN  Safety Promotion/Fall Prevention:   nonskid shoes/slippers when out of bed   safety round/check completed  Taken 2/3/2024 1100 by Comfort Zuluaga RN  Safety Promotion/Fall Prevention:   nonskid shoes/slippers when out of bed   safety round/check completed  Taken 2/3/2024 0936 by Comfort Zuluaga RN  Safety Promotion/Fall Prevention:   nonskid shoes/slippers when out of bed   safety round/check completed  Taken 2/3/2024 0818 by Comfort Zuluaga RN  Safety Promotion/Fall Prevention:   assistive device/personal items within reach   clutter free environment maintained   fall prevention program maintained   lighting adjusted   nonskid shoes/slippers when out of bed   room organization consistent   safety round/check completed  Taken 2/3/2024 0715 by Margareth  KANDY Moyer  Safety Promotion/Fall Prevention:   nonskid shoes/slippers when out of bed   safety round/check completed     Problem: Adult Inpatient Plan of Care  Goal: Plan of Care Review  Outcome: Met  Flowsheets (Taken 2/3/2024 1420)  Progress: no change  Plan of Care Reviewed With: patient  Outcome Evaluation: Patient discharging to [facility].  Goal: Patient-Specific Goal (Individualized)  Outcome: Met  Goal: Absence of Hospital-Acquired Illness or Injury  Outcome: Met  Intervention: Identify and Manage Fall Risk  Recent Flowsheet Documentation  Taken 2/3/2024 1401 by Comfort Zuluaga RN  Safety Promotion/Fall Prevention:   nonskid shoes/slippers when out of bed   safety round/check completed  Taken 2/3/2024 1227 by Comfort Zuluaga RN  Safety Promotion/Fall Prevention:   nonskid shoes/slippers when out of bed   safety round/check completed  Taken 2/3/2024 1100 by Comfort Zuluaga RN  Safety Promotion/Fall Prevention:   nonskid shoes/slippers when out of bed   safety round/check completed  Taken 2/3/2024 0936 by Comfort Zuluaga RN  Safety Promotion/Fall Prevention:   nonskid shoes/slippers when out of bed   safety round/check completed  Taken 2/3/2024 0818 by Comfort Zuluaga RN  Safety Promotion/Fall Prevention:   assistive device/personal items within reach   clutter free environment maintained   fall prevention program maintained   lighting adjusted   nonskid shoes/slippers when out of bed   room organization consistent   safety round/check completed  Taken 2/3/2024 0715 by Comfort Zuluaga RN  Safety Promotion/Fall Prevention:   nonskid shoes/slippers when out of bed   safety round/check completed  Intervention: Prevent and Manage VTE (Venous Thromboembolism) Risk  Recent Flowsheet Documentation  Taken 2/3/2024 0818 by Comfort Zuluaga RN  Range of Motion: active ROM (range of motion) encouraged  Intervention: Prevent Infection  Recent Flowsheet Documentation  Taken 2/3/2024 0818 by  Comfort Zuluaga, RN  Infection Prevention:   cohorting utilized   environmental surveillance performed   equipment surfaces disinfected   hand hygiene promoted   personal protective equipment utilized   rest/sleep promoted   single patient room provided  Goal: Optimal Comfort and Wellbeing  Outcome: Met  Intervention: Provide Person-Centered Care  Recent Flowsheet Documentation  Taken 2/3/2024 0818 by Comfort Zuluaga, RN  Trust Relationship/Rapport:   care explained   choices provided   emotional support provided   empathic listening provided   questions answered   questions encouraged   reassurance provided   thoughts/feelings acknowledged  Goal: Readiness for Transition of Care  Outcome: Met   Goal Outcome Evaluation:  Plan of Care Reviewed With: patient        Progress: no change  Outcome Evaluation: Patient discharging to [facility] assisted living.

## 2024-02-03 NOTE — PLAN OF CARE
Goal Outcome Evaluation:  Plan of Care Reviewed With: patient        Progress: no change  Outcome Evaluation: Pt resting well no C/O pain or discomfort to be noted. VSS. no acute events to report this shift. continue plan of  care.

## 2024-02-03 NOTE — DISCHARGE SUMMARY
[facility]         HOSPITALIST  DISCHARGE SUMMARY    Patient Name: Sonja Gunn  : 1940  MRN: [id number]    Date of Admission: 2024  Date of Discharge:  2024  Primary Care Physician: Mike Cordoba MD    Consults       Date and Time Order Name Status Description    2024  5:18 AM Inpatient Gastroenterology Consult      2024  1:34 AM Inpatient Hospitalist Consult      2024  1:25 AM IP Consult to General Surgery Completed             Active and Resolved Hospital Problems:  Active Hospital Problems    Diagnosis POA   • **Generalized abdominal pain [R10.84] Unknown   • Gastric outlet obstruction [K31.1] Yes   • Hiatal hernia [K44.9] Unknown   • Constipation [K59.00] Unknown   • History of dementia [Z86.59] Not Applicable      Resolved Hospital Problems   No resolved problems to display.       Hospital Course     Hospital Course:  Sonja Gunn is a 83 y.o. female with past medical history of advanced dementia, hypertension and GERD who presented with abdominal pain, nausea and vomiting from her assisted living facility.  Patient is nonverbal.  History was obtained from physician handoff and chart review by the admitting physician.  Patient started having abdominal discomfort 1 day prior to presentation followed by vomiting.  They were concerned about possible COVID so she was brought to the ED for further evaluation.  Patient was hypoxic in the ED requiring 2 L/min of oxygen via NC to maintain saturation.  Lab work was nonsignificant.  Chest x-ray showed no significant interval changes compared to prior study which showed large diaphragmatic hernia with associated atelectasis at the lung bases.  CT abdomen pelvis with contrast was obtained which showed a very large hiatal hernia with dilated stomach and small bowel and colon; associated with gastric outlet obstruction; mesentery volvulus with strangulation of small bowel within the hiatal hernia cannot be excluded;  very large stool burden; dilated fluid-filled esophagus at the level of upper thorax.  Surgery on-call was contacted by the ED physician who recommended NG tube and GI consult.  Patient was then admitted for further evaluation and management.  General surgery following the patient.  Patient underwent fluoroscopy upper GI on 1/31/2024 which showed large iatal hernia and distention of proximal duodenum which was slow to empty, mild related narrowing involving the duodenum but no complete obstruction; no concern for gastric outlet obstruction.  Started on full liquid diet.  Family did not want any surgery or any extensive interventions with the patient. Patient was advanced to puree diet which she tolerated well. Patient to continue puree diet. Patient is thus planned to be discharged today. She is stable at the time of discharge. She will follow up with PCP in 3-7 days. Fall precautions.        DISCHARGE Follow Up Recommendations for labs and diagnostics: As mentioned above.      Day of Discharge     Vital Signs:  Temp:  [97.3 °F (36.3 °C)-97.9 °F (36.6 °C)] 97.9 °F (36.6 °C)  Heart Rate:  [60-72] 60  Resp:  [16-18] 16  BP: ()/(58-79) 92/58  Physical Exam:   General: Awake, NAD  Cardiovascular: RRR, no murmurs   Pulmonary: CTA bilaterally; no wheezes; no conversational dyspnea  Gastrointestinal: S/ND/NT, +BS        Discharge Details        Discharge Medications        Continue These Medications        Instructions Start Date   divalproex 250 MG DR tablet  Commonly known as: DEPAKOTE   1 tablet, Oral, Every 12 Hours Scheduled      donepezil 10 MG tablet  Commonly known as: ARICEPT   10 mg, Oral, Nightly      GNP Vitamin B-12 1000 MCG tablet controlled-release  Generic drug: Cyanocobalamin ER   1 tablet, Oral, Daily      memantine 10 MG tablet  Commonly known as: NAMENDA   1 tablet, Oral, 2 Times Daily      mirtazapine 7.5 MG tablet  Commonly known as: REMERON   7.5 mg, Oral, Every Night at Bedtime       risperiDONE 0.5 MG disintegrating tablet  Commonly known as: risperDAL M-TABS   0.5 mg, Translingual, Daily               No Known Allergies    Discharge Disposition:  Long Term Care (DC - External)    Diet:  Hospital:  Diet Order   Procedures   • Diet: Regular/House Diet; Texture: Pureed (NDD 1); Fluid Consistency: Thin (IDDSI 0)       Discharge Activity:       CODE STATUS:  Code Status and Medical Interventions:   Ordered at: 01/31/24 1529     Medical Intervention Limits:    NO intubation (DNI)     Level Of Support Discussed With:    Health Care Surrogate     Code Status (Patient has no pulse and is not breathing):    No CPR (Do Not Attempt to Resuscitate)     Medical Interventions (Patient has pulse or is breathing):    Limited Support       No future appointments.    Additional Instructions for the Follow-ups that You Need to Schedule       Discharge Follow-up with PCP   As directed       Currently Documented PCP:    Mike Cordoba MD    PCP Phone Number:    [phone]     Follow Up Details: In 3-7 days; Trend CBC and chemistries trended during follow up.                Pertinent  and/or Most Recent Results     PROCEDURES:       LAB RESULTS:      Lab 02/03/24 0538 02/02/24 0536 02/01/24  0453 01/31/24  0830 01/30/24  2111   WBC 6.99 8.01 7.09  --  8.77   HEMOGLOBIN 11.7* 12.5 11.3*  --  13.9   HEMATOCRIT 36.2 38.9 37.2  --  43.7   PLATELETS 167 167 173  --  220   NEUTROS ABS 3.41 4.64 4.09  --  7.80*   IMMATURE GRANS (ABS) 0.03 0.02 0.02  --  0.03   LYMPHS ABS 2.40 2.21 1.97  --  0.68*   MONOS ABS 0.98* 0.96* 0.84  --  0.25   EOS ABS 0.15 0.16 0.13  --  0.00   MCV 94.3 94.9 97.4*  --  94.6   LACTATE  --   --   --  1.0  --          Lab 02/03/24 0538 02/02/24 0536 02/01/24  0453 01/30/24  2111   SODIUM 140 139 143 141   POTASSIUM 3.2* 3.4* 4.0 4.0   CHLORIDE 107 106 109* 102   CO2 23.1 22.1 26.5 27.9   ANION GAP 9.9 10.9 7.5 11.1   BUN 6* 12 14 14   CREATININE 0.64 0.72 0.84 0.90   EGFR 88.9 84.1 69.9 64.4    GLUCOSE 95 93 85 139*   CALCIUM 8.2* 8.5* 8.6 9.3   MAGNESIUM 1.8 1.8 2.0  --    PHOSPHORUS 3.0 2.6 3.0  --          Lab 01/30/24  2111   TOTAL PROTEIN 7.5   ALBUMIN 4.1   GLOBULIN 3.4   ALT (SGPT) 12   AST (SGOT) 14   BILIRUBIN 0.4   ALK PHOS 77         Lab 01/30/24  2111   PROBNP 195.8   HSTROP T 12                 Brief Urine Lab Results  (Last result in the past 365 days)        Color   Clarity   Blood   Leuk Est   Nitrite   Protein   CREAT   Urine HCG        02/02/24 2343 Yellow   Clear   Negative   Small (1+)   Negative   Negative                 Microbiology Results (last 10 days)       Procedure Component Value - Date/Time    Respiratory Panel PCR w/COVID-19(SARS-CoV-2) KATRINA/JONES/RUSSELL/PAD/COR/ANDREWS In-House, NP Swab in UTM/VTM, 2 HR TAT - Swab, Nasopharynx [[id number]]  (Normal) Collected: 01/31/24 1747    Lab Status: Final result Specimen: Swab from Nasopharynx Updated: 01/31/24 1857     ADENOVIRUS, PCR Not Detected     Coronavirus 229E Not Detected     Coronavirus HKU1 Not Detected     Coronavirus NL63 Not Detected     Coronavirus OC43 Not Detected     COVID19 Not Detected     Human Metapneumovirus Not Detected     Human Rhinovirus/Enterovirus Not Detected     Influenza A PCR Not Detected     Influenza B PCR Not Detected     Parainfluenza Virus 1 Not Detected     Parainfluenza Virus 2 Not Detected     Parainfluenza Virus 3 Not Detected     Parainfluenza Virus 4 Not Detected     RSV, PCR Not Detected     Bordetella pertussis pcr Not Detected     Bordetella parapertussis PCR Not Detected     Chlamydophila pneumoniae PCR Not Detected     Mycoplasma pneumo by PCR Not Detected    Narrative:      In the setting of a positive respiratory panel with a viral infection PLUS a negative procalcitonin without other underlying concern for bacterial infection, consider observing off antibiotics or discontinuation of antibiotics and continue supportive care. If the respiratory panel is positive for atypical bacterial  infection (Bordetella pertussis, Chlamydophila pneumoniae, or Mycoplasma pneumoniae), consider antibiotic de-escalation to target atypical bacterial infection.    COVID-19, FLU A/B, RSV PCR 1 HR TAT - Swab, Nasopharynx [[id number]]  (Normal) Collected: 01/30/24 2107    Lab Status: Final result Specimen: Swab from Nasopharynx Updated: 01/30/24 2203     COVID19 Not Detected     Influenza A PCR Not Detected     Influenza B PCR Not Detected     RSV, PCR Not Detected    Narrative:      Fact sheet for providers: https://www.fda.gov/media/373268/download    Fact sheet for patients: https://www.fda.gov/media/210421/download    Test performed by PCR.            FL Upper GI Water Soluble    Result Date: 1/31/2024  1. Examination limited secondary to patient's history of dementia and inability to stand for prolonged periods of time.  Patient ingested about 40 cubic centimeters of water-soluble contrast. 2. Large hiatal hernia with most of the stomach in an intrathoracic location.  Organo-axial position similar to previous CT scan including CT from 11/2/2020.  3. There is distention of proximal duodenum which was slow to empty with to and fro motion observed.  There is mild relative narrowing involving the duodenum as it crosses midline.  No complete obstruction is evident.  When patient was turned into a left lateral position, contrast emptied twice into the proximal small bowel twice during the exam.       Exam directly supervised by Dr. Neftali BELLAMY       Electronically Signed and Approved By: DELVIS MATTSON MD on 1/31/2024 at 14:25             CT Abdomen Pelvis With Contrast    Result Date: 1/31/2024    1. A very large hiatal hernia is seen, which contains a dilated stomach and small bowel and colon as well as other structures.  An associated gastric outlet obstruction (such as related to an organoaxial volvulus) cannot be excluded.  Mesenteric volvulus with strangulation of small bowel within the hiatal  hernia cannot be excluded; however, the small bowel is nondilated.  No pneumatosis or portal or mesenteric venous gas is seen to suggest ischemic bowel.  No definite extraluminal fluid collections are seen to suggest perforation.  No pneumoperitoneum.  2. A very large stool burden is seen.  There may be fecal stasis (or fecal retention) as with constipation.  Fecal impaction is possible.  Age-indeterminate stercoral colo-proctitis cannot be excluded.  No definite stercoral ulceration.  No perirectal fluid collection is seen to suggest abscess.  The maximum AP diameter of the rectum is about 11.3 cm.  3. The appendix is not clearly identified.  4. The gallbladder is distended.  There are gallstones without definite acute cholecystitis.  Extrinsic deformity of the cystic duct is seen, as discussed above.  No acute pancreatitis.  5. The study is very limited for several reasons, as detailed above.  6. There is a dilated fluid-filled esophagus (estimated at 4 cm in maximum diameter) at the level of the upper thorax. 7. Please see above comments for further detail.  1.   Please note that portions of this note were completed with a voice recognition program.  LUCIANO ESTES JR, MD       Electronically Signed and Approved By: LUCIANO ESTES JR, MD on 1/31/2024 at 0:49              XR Chest 1 View    Result Date: 1/30/2024    No significant interval change is suggested radiographically since prior studies.  Please see above comments for further detail.      Please note that portions of this note were completed with a voice recognition program.  LUCIANO ESTES JR, MD       Electronically Signed and Approved By: LUCIANO ESTES JR, MD on 1/30/2024 at 21:49                            Labs Pending at Discharge:  Pending Labs       Order Current Status    Urine Culture - Urine, Urine, Clean Catch In process              Time spent on Discharge including face to face service:  35 minutes    Electronically signed by Patricia Teixeira  MD, 02/03/24, 12:43 PM EST.

## 2024-02-04 LAB — BACTERIA SPEC AEROBE CULT: NORMAL
